# Patient Record
Sex: FEMALE | Race: BLACK OR AFRICAN AMERICAN | NOT HISPANIC OR LATINO | Employment: OTHER | URBAN - METROPOLITAN AREA
[De-identification: names, ages, dates, MRNs, and addresses within clinical notes are randomized per-mention and may not be internally consistent; named-entity substitution may affect disease eponyms.]

---

## 2017-05-31 ENCOUNTER — HOSPITAL ENCOUNTER (EMERGENCY)
Facility: OTHER | Age: 82
End: 2017-05-31
Attending: EMERGENCY MEDICINE
Payer: MEDICARE

## 2017-05-31 VITALS
BODY MASS INDEX: 21.6 KG/M2 | DIASTOLIC BLOOD PRESSURE: 68 MMHG | WEIGHT: 110 LBS | HEIGHT: 60 IN | OXYGEN SATURATION: 97 % | RESPIRATION RATE: 18 BRPM | TEMPERATURE: 99 F | HEART RATE: 80 BPM | SYSTOLIC BLOOD PRESSURE: 162 MMHG
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
## 2017-05-31 PROCEDURE — 99284 EMERGENCY DEPT VISIT MOD MDM: CPT

## 2017-05-31 RX ORDER — LANOLIN ALCOHOL/MO/W.PET/CERES
100 CREAM (GRAM) TOPICAL DAILY
COMMUNITY

## 2017-05-31 RX ORDER — CLOPIDOGREL BISULFATE 75 MG/1
75 TABLET ORAL DAILY
Status: ON HOLD | COMMUNITY
End: 2017-06-17 | Stop reason: HOSPADM

## 2017-05-31 RX ORDER — LEVETIRACETAM 500 MG/1
250 TABLET ORAL 2 TIMES DAILY
COMMUNITY
End: 2017-06-29 | Stop reason: SDUPTHER

## 2017-05-31 RX ORDER — ATORVASTATIN CALCIUM 20 MG/1
20 TABLET, FILM COATED ORAL NIGHTLY
Status: ON HOLD | COMMUNITY
End: 2017-08-20 | Stop reason: HOSPADM

## 2017-05-31 NOTE — ED PROVIDER NOTES
Encounter Date: 5/31/2017    SCRIBE #1 NOTE: I, Radha Calderón, am scribing for, and in the presence of,  Dr. Christie. I have scribed the entire note.       History     Chief Complaint   Patient presents with    Fall     Pt sent to ED from [facility], after falling out of wheelchair. Pt CO right arm pain, and has small laceration to right forehead.     Review of patient's allergies indicates:   Allergen Reactions    Lactose      Time seen by provider: 12:13 PM    This is a 87 y.o. non-ambulatory female with a history of a stroke who presents to ED via EMS from NH with complaint of fall a few hours ago. She states that her left foot got stuck in the wheelchair causing her to tip over, with the wheelchair reportedly on top of her. She spent some time on the floor laying on her left arm and leg causing her pain until EMS arrived and pulled it off her, but denies any residual pain. She claims no head trauma but has forehead lesion that is painful. She also denies LOC, neck pain, back pain, chest pain, fever, or trouble breathing. She states her health is at it's baseline, has had good appetite recently, and otherwise feels good. Patient is a poor historian.       The history is provided by the patient and the EMS personnel.     Past Medical History:   Diagnosis Date    Anemia of decreased vitamin B12 absorption     Aphasia     Coronary atherosclerosis of unspecified type of vessel, native or graft     Epilepsy     Hemiparesis affecting right side as late effect of cerebrovascular accident     Hypertension     Hypokalemia     Other and unspecified hyperlipidemia     Stroke     Transient ischemic attack (TIA), and cerebral infarction without residual deficits     Unspecified hypothyroidism      No past surgical history on file.  No family history on file.  Social History   Substance Use Topics    Smoking status: Former Smoker    Smokeless tobacco: Not on file    Alcohol use Not on file      Review of Systems   Constitutional: Negative for chills and fever.   HENT: Negative for ear pain and sore throat.    Eyes: Negative for pain.   Respiratory: Negative for chest tightness and shortness of breath.    Cardiovascular: Negative for chest pain and leg swelling.   Gastrointestinal: Negative for abdominal pain and nausea.   Genitourinary: Negative for dysuria.   Musculoskeletal: Negative for back pain and neck pain.        Left arm and foot pain    Skin: Positive for wound. Negative for pallor and rash.   Neurological: Negative for syncope and weakness.   Hematological: Does not bruise/bleed easily.   Psychiatric/Behavioral: Negative for behavioral problems.       Physical Exam     Initial Vitals [05/31/17 0927]   BP Pulse Resp Temp SpO2   124/63 84 18 98.6 °F (37 °C) 99 %     Physical Exam    Nursing note and vitals reviewed.  Constitutional: She appears well-developed and well-nourished. She is not diaphoretic. No distress.   HENT:   Head: Normocephalic.   Right Ear: External ear normal.   Left Ear: External ear normal.   Small 2.0cm area of edema and tenderness above right lateral eyebrow. Left temporal scalp with 7.0cm superficial abrasion.    Eyes: Pupils are equal, round, and reactive to light. Right eye exhibits no discharge. Left eye exhibits no discharge.   Neck: Normal range of motion. Neck supple.   Cardiovascular: Normal rate, regular rhythm and normal heart sounds. Exam reveals no gallop and no friction rub.    No murmur heard.  Pulmonary/Chest: Breath sounds normal. No respiratory distress. She has no wheezes. She has no rhonchi. She has no rales.   Abdominal: Soft. Bowel sounds are normal. She exhibits no distension. There is no tenderness. There is no rebound and no guarding.   Musculoskeletal: Normal range of motion. She exhibits no tenderness.   Extremities with no focal tenderness, deformities, ecchymosis. Full ROM.   Neurological: She is alert and oriented to person, place, and time.  She has normal strength. No sensory deficit.   Skin: Skin is warm and dry. No rash noted. No erythema.   Psychiatric: She has a normal mood and affect. Her behavior is normal. Judgment and thought content normal.         ED Course   Procedures  Labs Reviewed - No data to display       X-Rays:   Independently Interpreted Readings:   Head CT: No hemorrhage. No skull fracture. Old CVA in right occipital lobe.     Imaging Results          CT Head Without Contrast (Final result)  Result time 05/31/17 13:10:29    Final result by Harley Aguirre MD (05/31/17 13:10:29)                 Impression:        Multiple remote supratentorial and infratentorial infarcts with age-indeterminate left cerebellar infarct.    Right frontal scalp soft tissue thickening. No fracture or intracranial hemorrhage.    Cerebral volume loss and findings of chronic microvascular ischemic disease.      Electronically signed by: HARLEY AGUIRRE  Date:     05/31/17  Time:    13:10              Narrative:    CLINICAL INDICATION: 87 year old F with fall, head trauma. Right forehead laceration.     TECHNIQUE: CT head without contrast. Axial CT images obtained throughout the head without intravenous contrast. Sagittal and coronal reconstructions were performed.    COMPARISON: No priors.    FINDINGS:    Intracranial compartment:    There is generalized cerebral volume loss, with prominence of ventricles and sulci. No extra-axial blood or fluid collections.    There are large confluent areas of right greater than left encephalomalacia/gliosis in keeping with remote watershed distribution infarcts. Superimposed pronounced degree of confluent periventricular/subcortical white matter hypoattenuation likely relates to sequela of chronic microvascular ischemic disease. Small remote cerebellar infarcts are present bilaterally. Larger left cerebellar infarct is age-indeterminate in appearance. No parenchymal mass, hemorrhage, or edema.      Skull/extracranial contents  (limited evaluation): Right frontal subcutaneous soft tissue thickening. No fracture. Mastoid air cells and paranasal sinuses are essentially clear.                              Medical Decision Making:   Initial Assessment:       87F with h/o CVA with R sided weakness, CAD/HTN presents after mechanical fall. Pt otherwise at baseline recently, reportedly attempted to get out of wheelchair unassisted and it fell over. Initially c/o R arm and leg pain that resolved after she got wheelchair off of her, no sign of injury or focal tenderness on exam, no sign fracture. She denies LOC, but has evidence of minor head trauma, is on Plavix and unreliable historian, so CT head done to r/o intra-cranial injury and no acute process. Normal vitals and no other complaints, no indication for labs or other workup. Stable for discharge back to NH, return precautions given.    Clinical Tests:   Radiological Study: Ordered and Reviewed            Scribe Attestation:   Scribe #1: I performed the above scribed service and the documentation accurately describes the services I performed. I attest to the accuracy of the note.    Attending Attestation:           Physician Attestation for Scribe:  Physician Attestation Statement for Scribe #1: I, Dr. Christie, reviewed documentation, as scribed by Radha Calderón in my presence, and it is both accurate and complete.                 ED Course     Clinical Impression:     1. Fall, initial encounter                Chun Christie MD  05/31/17 1900

## 2017-05-31 NOTE — ED NOTES
EMS reports pt from nursing home fro falling over in wheelchair. Pt denies pain at present time. PT w/ right side weakness/paralysis from previous CVA. Pt is awake and alert, answers questions appropriately. 10cm superficial laceration noted to right side of head, bleeding controlled. Fall risk band applied per protocol

## 2017-06-15 ENCOUNTER — HOSPITAL ENCOUNTER (INPATIENT)
Facility: OTHER | Age: 82
LOS: 2 days | Discharge: HOSPICE | DRG: 669 | End: 2017-06-17
Attending: EMERGENCY MEDICINE | Admitting: EMERGENCY MEDICINE
Payer: MEDICARE

## 2017-06-15 DIAGNOSIS — N17.9 ACUTE RENAL FAILURE, UNSPECIFIED ACUTE RENAL FAILURE TYPE: ICD-10-CM

## 2017-06-15 DIAGNOSIS — I25.10 CORONARY ARTERY DISEASE, ANGINA PRESENCE UNSPECIFIED, UNSPECIFIED VESSEL OR LESION TYPE, UNSPECIFIED WHETHER NATIVE OR TRANSPLANTED HEART: ICD-10-CM

## 2017-06-15 DIAGNOSIS — E87.20 METABOLIC ACIDOSIS, NORMAL ANION GAP (NAG): ICD-10-CM

## 2017-06-15 DIAGNOSIS — G40.909 NONINTRACTABLE EPILEPSY WITHOUT STATUS EPILEPTICUS, UNSPECIFIED EPILEPSY TYPE: ICD-10-CM

## 2017-06-15 DIAGNOSIS — E87.5 HYPERKALEMIA: Primary | ICD-10-CM

## 2017-06-15 DIAGNOSIS — I10 ESSENTIAL HYPERTENSION: ICD-10-CM

## 2017-06-15 DIAGNOSIS — N17.9 ACUTE RENAL FAILURE: ICD-10-CM

## 2017-06-15 DIAGNOSIS — C67.9 MALIGNANT NEOPLASM OF URINARY BLADDER, UNSPECIFIED SITE: ICD-10-CM

## 2017-06-15 DIAGNOSIS — E83.39 HYPERPHOSPHATEMIA: ICD-10-CM

## 2017-06-15 DIAGNOSIS — N13.30 HYDRONEPHROSIS, UNSPECIFIED HYDRONEPHROSIS TYPE: ICD-10-CM

## 2017-06-15 DIAGNOSIS — R31.9 HEMATURIA: ICD-10-CM

## 2017-06-15 PROBLEM — I69.30 HISTORY OF CVA WITH RESIDUAL DEFICIT: Status: ACTIVE | Noted: 2017-06-15

## 2017-06-15 LAB
ANION GAP SERPL CALC-SCNC: 17 MMOL/L
BACTERIA #/AREA URNS HPF: ABNORMAL /HPF
BASOPHILS # BLD AUTO: 0.01 K/UL
BASOPHILS # BLD AUTO: 0.01 K/UL
BASOPHILS NFR BLD: 0.1 %
BASOPHILS NFR BLD: 0.1 %
BILIRUB UR QL STRIP: NEGATIVE
BUN SERPL-MCNC: 118 MG/DL
CALCIUM SERPL-MCNC: 8.7 MG/DL
CHLORIDE SERPL-SCNC: 105 MMOL/L
CLARITY UR: ABNORMAL
CO2 SERPL-SCNC: 19 MMOL/L
COLOR UR: ABNORMAL
CREAT SERPL-MCNC: 14 MG/DL
CREAT UR-MCNC: 23.6 MG/DL
DIFFERENTIAL METHOD: ABNORMAL
DIFFERENTIAL METHOD: ABNORMAL
EOSINOPHIL # BLD AUTO: 0.1 K/UL
EOSINOPHIL # BLD AUTO: 0.1 K/UL
EOSINOPHIL NFR BLD: 0.9 %
EOSINOPHIL NFR BLD: 1.6 %
ERYTHROCYTE [DISTWIDTH] IN BLOOD BY AUTOMATED COUNT: 12.7 %
ERYTHROCYTE [DISTWIDTH] IN BLOOD BY AUTOMATED COUNT: 12.9 %
EST. GFR  (AFRICAN AMERICAN): 2 ML/MIN/1.73 M^2
EST. GFR  (NON AFRICAN AMERICAN): 2 ML/MIN/1.73 M^2
GLUCOSE SERPL-MCNC: 89 MG/DL
GLUCOSE UR QL STRIP: NEGATIVE
HCT VFR BLD AUTO: 27.5 %
HCT VFR BLD AUTO: 29.1 %
HGB BLD-MCNC: 10.4 G/DL
HGB BLD-MCNC: 9.9 G/DL
HGB UR QL STRIP: ABNORMAL
HYALINE CASTS #/AREA URNS LPF: 0 /LPF
KETONES UR QL STRIP: NEGATIVE
LEUKOCYTE ESTERASE UR QL STRIP: ABNORMAL
LYMPHOCYTES # BLD AUTO: 0.9 K/UL
LYMPHOCYTES # BLD AUTO: 1.2 K/UL
LYMPHOCYTES NFR BLD: 10 %
LYMPHOCYTES NFR BLD: 15 %
MCH RBC QN AUTO: 30.8 PG
MCH RBC QN AUTO: 31 PG
MCHC RBC AUTO-ENTMCNC: 35.7 %
MCHC RBC AUTO-ENTMCNC: 36 %
MCV RBC AUTO: 86 FL
MCV RBC AUTO: 86 FL
MICROSCOPIC COMMENT: ABNORMAL
MONOCYTES # BLD AUTO: 0.8 K/UL
MONOCYTES # BLD AUTO: 1 K/UL
MONOCYTES NFR BLD: 10.4 %
MONOCYTES NFR BLD: 11.7 %
NEUTROPHILS # BLD AUTO: 5.6 K/UL
NEUTROPHILS # BLD AUTO: 6.6 K/UL
NEUTROPHILS NFR BLD: 72.4 %
NEUTROPHILS NFR BLD: 76.7 %
NITRITE UR QL STRIP: NEGATIVE
PH UR STRIP: 7 [PH] (ref 5–8)
PLATELET # BLD AUTO: 262 K/UL
PLATELET # BLD AUTO: 263 K/UL
PMV BLD AUTO: 8.6 FL
PMV BLD AUTO: 8.7 FL
POCT GLUCOSE: 110 MG/DL (ref 70–110)
POCT GLUCOSE: 198 MG/DL (ref 70–110)
POCT GLUCOSE: 76 MG/DL (ref 70–110)
POCT GLUCOSE: 81 MG/DL (ref 70–110)
POCT GLUCOSE: 96 MG/DL (ref 70–110)
POTASSIUM SERPL-SCNC: 6.5 MMOL/L
PROT UR QL STRIP: ABNORMAL
PROT UR-MCNC: 100 MG/DL
PROT/CREAT RATIO, UR: 4.24
RBC # BLD AUTO: 3.19 M/UL
RBC # BLD AUTO: 3.38 M/UL
RBC #/AREA URNS HPF: >100 /HPF (ref 0–4)
SODIUM SERPL-SCNC: 141 MMOL/L
SODIUM UR-SCNC: 106 MMOL/L
SP GR UR STRIP: <=1.005 (ref 1–1.03)
SQUAMOUS #/AREA URNS HPF: 2 /HPF
URATE SERPL-MCNC: 7.8 MG/DL
URN SPEC COLLECT METH UR: ABNORMAL
UROBILINOGEN UR STRIP-ACNC: NEGATIVE EU/DL
WBC # BLD AUTO: 7.67 K/UL
WBC # BLD AUTO: 8.58 K/UL
WBC #/AREA URNS HPF: 27 /HPF (ref 0–5)

## 2017-06-15 PROCEDURE — 81000 URINALYSIS NONAUTO W/SCOPE: CPT

## 2017-06-15 PROCEDURE — 84156 ASSAY OF PROTEIN URINE: CPT

## 2017-06-15 PROCEDURE — 36415 COLL VENOUS BLD VENIPUNCTURE: CPT

## 2017-06-15 PROCEDURE — 82803 BLOOD GASES ANY COMBINATION: CPT

## 2017-06-15 PROCEDURE — 85025 COMPLETE CBC W/AUTO DIFF WBC: CPT | Mod: 91

## 2017-06-15 PROCEDURE — 51702 INSERT TEMP BLADDER CATH: CPT

## 2017-06-15 PROCEDURE — 63600175 PHARM REV CODE 636 W HCPCS: Performed by: INTERNAL MEDICINE

## 2017-06-15 PROCEDURE — 82962 GLUCOSE BLOOD TEST: CPT

## 2017-06-15 PROCEDURE — 25000003 PHARM REV CODE 250: Performed by: PHYSICIAN ASSISTANT

## 2017-06-15 PROCEDURE — 25000003 PHARM REV CODE 250: Performed by: EMERGENCY MEDICINE

## 2017-06-15 PROCEDURE — 25000242 PHARM REV CODE 250 ALT 637 W/ HCPCS: Performed by: EMERGENCY MEDICINE

## 2017-06-15 PROCEDURE — 80053 COMPREHEN METABOLIC PANEL: CPT

## 2017-06-15 PROCEDURE — 84550 ASSAY OF BLOOD/URIC ACID: CPT

## 2017-06-15 PROCEDURE — 99223 1ST HOSP IP/OBS HIGH 75: CPT | Mod: AI,,, | Performed by: PHYSICIAN ASSISTANT

## 2017-06-15 PROCEDURE — 80048 BASIC METABOLIC PNL TOTAL CA: CPT | Mod: 91

## 2017-06-15 PROCEDURE — 96361 HYDRATE IV INFUSION ADD-ON: CPT

## 2017-06-15 PROCEDURE — 93010 ELECTROCARDIOGRAM REPORT: CPT | Mod: ,,, | Performed by: INTERNAL MEDICINE

## 2017-06-15 PROCEDURE — 83735 ASSAY OF MAGNESIUM: CPT

## 2017-06-15 PROCEDURE — 87086 URINE CULTURE/COLONY COUNT: CPT

## 2017-06-15 PROCEDURE — 63600175 PHARM REV CODE 636 W HCPCS: Performed by: EMERGENCY MEDICINE

## 2017-06-15 PROCEDURE — 84300 ASSAY OF URINE SODIUM: CPT

## 2017-06-15 PROCEDURE — 96375 TX/PRO/DX INJ NEW DRUG ADDON: CPT

## 2017-06-15 PROCEDURE — 84100 ASSAY OF PHOSPHORUS: CPT

## 2017-06-15 PROCEDURE — 20000000 HC ICU ROOM

## 2017-06-15 PROCEDURE — 99900035 HC TECH TIME PER 15 MIN (STAT)

## 2017-06-15 PROCEDURE — 94640 AIRWAY INHALATION TREATMENT: CPT

## 2017-06-15 PROCEDURE — 96365 THER/PROPH/DIAG IV INF INIT: CPT

## 2017-06-15 PROCEDURE — 99285 EMERGENCY DEPT VISIT HI MDM: CPT | Mod: 25

## 2017-06-15 PROCEDURE — 36600 WITHDRAWAL OF ARTERIAL BLOOD: CPT

## 2017-06-15 PROCEDURE — 80048 BASIC METABOLIC PNL TOTAL CA: CPT

## 2017-06-15 PROCEDURE — 93005 ELECTROCARDIOGRAM TRACING: CPT

## 2017-06-15 RX ORDER — FUROSEMIDE 10 MG/ML
80 INJECTION INTRAMUSCULAR; INTRAVENOUS
Status: COMPLETED | OUTPATIENT
Start: 2017-06-15 | End: 2017-06-15

## 2017-06-15 RX ORDER — SODIUM CHLORIDE 0.9 % (FLUSH) 0.9 %
3 SYRINGE (ML) INJECTION EVERY 8 HOURS
Status: DISCONTINUED | OUTPATIENT
Start: 2017-06-15 | End: 2017-06-17 | Stop reason: HOSPADM

## 2017-06-15 RX ORDER — HEPARIN SODIUM 5000 [USP'U]/ML
5000 INJECTION, SOLUTION INTRAVENOUS; SUBCUTANEOUS EVERY 8 HOURS
Status: DISCONTINUED | OUTPATIENT
Start: 2017-06-15 | End: 2017-06-15

## 2017-06-15 RX ORDER — ONDANSETRON 2 MG/ML
8 INJECTION INTRAMUSCULAR; INTRAVENOUS EVERY 12 HOURS PRN
Status: DISCONTINUED | OUTPATIENT
Start: 2017-06-15 | End: 2017-06-17 | Stop reason: HOSPADM

## 2017-06-15 RX ORDER — ACETAMINOPHEN 325 MG/1
650 TABLET ORAL EVERY 4 HOURS PRN
Status: DISCONTINUED | OUTPATIENT
Start: 2017-06-15 | End: 2017-06-17 | Stop reason: HOSPADM

## 2017-06-15 RX ORDER — GLUCAGON 1 MG
1 KIT INJECTION
Status: DISCONTINUED | OUTPATIENT
Start: 2017-06-15 | End: 2017-06-17

## 2017-06-15 RX ORDER — SODIUM CHLORIDE 9 MG/ML
INJECTION, SOLUTION INTRAVENOUS CONTINUOUS
Status: DISCONTINUED | OUTPATIENT
Start: 2017-06-15 | End: 2017-06-17 | Stop reason: HOSPADM

## 2017-06-15 RX ORDER — ALBUTEROL SULFATE 0.83 MG/ML
10 SOLUTION RESPIRATORY (INHALATION)
Status: COMPLETED | OUTPATIENT
Start: 2017-06-15 | End: 2017-06-15

## 2017-06-15 RX ORDER — SODIUM CHLORIDE 9 MG/ML
1000 INJECTION, SOLUTION INTRAVENOUS
Status: COMPLETED | OUTPATIENT
Start: 2017-06-15 | End: 2017-06-15

## 2017-06-15 RX ORDER — DEXTROSE 50 % IN WATER (D50W) INTRAVENOUS SYRINGE
25
Status: COMPLETED | OUTPATIENT
Start: 2017-06-15 | End: 2017-06-15

## 2017-06-15 RX ORDER — METOLAZONE 5 MG/1
10 TABLET ORAL
Status: DISPENSED | OUTPATIENT
Start: 2017-06-15 | End: 2017-06-16

## 2017-06-15 RX ORDER — IBUPROFEN 200 MG
16 TABLET ORAL
Status: DISCONTINUED | OUTPATIENT
Start: 2017-06-15 | End: 2017-06-17

## 2017-06-15 RX ORDER — IBUPROFEN 200 MG
24 TABLET ORAL
Status: DISCONTINUED | OUTPATIENT
Start: 2017-06-15 | End: 2017-06-17

## 2017-06-15 RX ORDER — FAMOTIDINE 10 MG/ML
20 INJECTION INTRAVENOUS EVERY 12 HOURS
Status: DISCONTINUED | OUTPATIENT
Start: 2017-06-15 | End: 2017-06-17 | Stop reason: HOSPADM

## 2017-06-15 RX ADMIN — FAMOTIDINE 20 MG: 10 INJECTION INTRAVENOUS at 08:06

## 2017-06-15 RX ADMIN — ALBUTEROL SULFATE 10 MG: 2.5 SOLUTION RESPIRATORY (INHALATION) at 08:06

## 2017-06-15 RX ADMIN — SODIUM CHLORIDE 1000 ML: 0.9 INJECTION, SOLUTION INTRAVENOUS at 07:06

## 2017-06-15 RX ADMIN — FUROSEMIDE 80 MG: 10 INJECTION, SOLUTION INTRAMUSCULAR; INTRAVENOUS at 08:06

## 2017-06-15 RX ADMIN — SODIUM CHLORIDE: 0.9 INJECTION, SOLUTION INTRAVENOUS at 10:06

## 2017-06-15 RX ADMIN — CEFTRIAXONE 1 G: 1 INJECTION, SOLUTION INTRAVENOUS at 11:06

## 2017-06-15 RX ADMIN — DEXTROSE MONOHYDRATE 25 G: 25 INJECTION, SOLUTION INTRAVENOUS at 07:06

## 2017-06-15 RX ADMIN — Medication 1 G: at 07:06

## 2017-06-15 RX ADMIN — INSULIN HUMAN 10 UNITS: 100 INJECTION, SOLUTION PARENTERAL at 07:06

## 2017-06-15 NOTE — ED PROVIDER NOTES
Encounter Date: 6/15/2017    SCRIBE #1 NOTE: I, Selvin Salazar, am scribing for, and in the presence of, Dr. Noe.       History     Chief Complaint   Patient presents with    Abnormal Lab     elevated BUN, creatinine and potassium, sent from [facility] by Sivakumar GUDINO     Review of patient's allergies indicates:   Allergen Reactions    Lactose      Time seen by provider: 4:43 PM    This is a 87 y.o. female who presents to the ED with abnormal labs. The patient was sent in by her [facility] home physician via EMS for elevated potassium, creatinine, and BUN. The patient has no pain or complaints at this time.     The patient denies any fever, chills, cough, SOB, CP, abdominal pain, or palpitations.     Hx of a stroke and HTN noted. No known allergies reported.           Past Medical History:   Diagnosis Date    Anemia of decreased vitamin B12 absorption     Aphasia     Coronary atherosclerosis of unspecified type of vessel, native or graft     Epilepsy     Hemiparesis affecting right side as late effect of cerebrovascular accident     Hypertension     Hypokalemia     Hypothyroidism     Other and unspecified hyperlipidemia     Stroke     Transient ischemic attack (TIA), and cerebral infarction without residual deficits     Unspecified hypothyroidism      History reviewed. No pertinent surgical history.  History reviewed. No pertinent family history.  Social History   Substance Use Topics    Smoking status: Former Smoker    Smokeless tobacco: Not on file    Alcohol use No     Review of Systems   Constitutional: Negative for chills and fever.   HENT: Negative for sore throat.    Respiratory: Negative for cough and shortness of breath.    Cardiovascular: Negative for chest pain and palpitations.   Gastrointestinal: Negative for abdominal pain and nausea.   Genitourinary: Negative for dysuria.   Musculoskeletal: Negative for back pain.   Skin: Negative for rash.   Neurological: Negative for weakness.    Hematological: Does not bruise/bleed easily.       Physical Exam     Initial Vitals [06/15/17 1641]   BP Pulse Resp Temp SpO2   138/65 75 18 98.1 °F (36.7 °C) 98 %     Physical Exam    Nursing note and vitals reviewed.  Constitutional: She appears well-developed and well-nourished. She is not diaphoretic. No distress.   HENT:   Head: Normocephalic and atraumatic.   Right Ear: External ear normal.   Left Ear: External ear normal.   Nose: Nose normal.   Mouth/Throat: Oropharynx is clear and moist.   Eyes: Conjunctivae and EOM are normal. Pupils are equal, round, and reactive to light. Right eye exhibits no discharge. Left eye exhibits no discharge.   Neck: Normal range of motion.   Cardiovascular: Normal rate, regular rhythm and normal heart sounds. Exam reveals no gallop and no friction rub.    No murmur heard.  Pulmonary/Chest: Breath sounds normal. No respiratory distress. She has no wheezes. She has no rhonchi. She has no rales.   Abdominal: Soft. There is no tenderness. There is no rebound and no guarding.   Musculoskeletal: She exhibits no edema or tenderness.   Right upper extremity is contracted.   Neurological: She is alert and oriented to person, place, and time.   Skin: Skin is warm and dry. No rash and no abscess noted. No erythema. No pallor.   Psychiatric: She has a normal mood and affect. Her behavior is normal. Judgment and thought content normal.         ED Course   Critical Care  Date/Time: 6/15/2017 9:55 PM  Performed by: ALEXA LEVY  Authorized by: ALEXA LEVY   Direct patient critical care time: 12 minutes  Additional history critical care time: 6 minutes  Ordering / reviewing critical care time: 6 minutes  Documentation critical care time: 4 minutes  Consulting other physicians critical care time: 6 minutes  Total critical care time (exclusive of procedural time) : 34 minutes  Critical care was necessary to treat or prevent imminent or life-threatening deterioration of the following  conditions: renal failure.  Critical care was time spent personally by me on the following activities: blood draw for specimens, development of treatment plan with patient or surrogate, discussions with consultants, interpretation of cardiac output measurements, evaluation of patient's response to treatment, examination of patient, obtaining history from patient or surrogate, ordering and performing treatments and interventions, ordering and review of laboratory studies, pulse oximetry and re-evaluation of patient's condition.        Labs Reviewed   CBC W/ AUTO DIFFERENTIAL - Abnormal; Notable for the following:        Result Value    RBC 3.19 (*)     Hemoglobin 9.9 (*)     Hematocrit 27.5 (*)     MPV 8.7 (*)     Lymph% 15.0 (*)     All other components within normal limits   URINALYSIS - Abnormal; Notable for the following:     Color, UA Red (*)     Appearance, UA Cloudy (*)     Specific Gravity, UA <=1.005 (*)     Protein, UA 2+ (*)     Occult Blood UA 3+ (*)     Leukocytes, UA Trace (*)     All other components within normal limits   BASIC METABOLIC PANEL - Abnormal; Notable for the following:     Potassium 6.5 (*)     CO2 19 (*)     BUN, Bld 118 (*)     Creatinine 14.0 (*)     Anion Gap 17 (*)     eGFR if  2 (*)     eGFR if non  2 (*)     All other components within normal limits    Narrative:     K critical result(s) called and verbal readback obtained from Mark Reid  Cary, 06/15/2017 18:54   URINALYSIS MICROSCOPIC - Abnormal; Notable for the following:     RBC, UA >100 (*)     WBC, UA 27 (*)     All other components within normal limits   POCT GLUCOSE - Abnormal; Notable for the following:     POCT Glucose 198 (*)     All other components within normal limits   CULTURE, URINE   CULTURE, URINE   COMPREHENSIVE METABOLIC PANEL   MAGNESIUM   PHOSPHORUS   POCT GLUCOSE   POCT GLUCOSE   POCT GLUCOSE   POCT GLUCOSE MONITORING CONTINUOUS   POCT GLUCOSE MONITORING CONTINUOUS    POCT GLUCOSE MONITORING CONTINUOUS     EKG Readings: (Independently Interpreted)   Normal sinus rhythm. Rate of 74. Mild sinus arrhythmia present. No STEMI. No peaked T waves.           Medical Decision Making:   ED Management:  6:55 PM: Received call from lab with results of a critical potassium at 6.5.     7:01 PM: Discussed the patient's case with Piper LEMUS who will admit to the hospitalist's service.     7:14 PM: Discussed the patient's case with Dr. Rosario he will follow the patient. He will repeat BMP after giving metolazone, fluids, and other treatments.     7:16 PM: Discussed the case again with hospitalist PA. Orders are in progress. She has discussed the case with critical care.  She is advised that Dr. Rosario should be called with results of repeat BMP.    Emergent evaluation of 87-year-old female with complaint of elevated potassium.  She is asymptomatic and denies any complaints.  Vital signs are benign, afebrile.  Physical exam is benign.  There were no significant EKG changes, but potassium is indeed elevated at 6.5.  There is significant elevation of BUN/creatinine creatinine.  She was treated with calcium gluconate, albuterol, insulin and D50.  I discussed the case with nephrology recommended metolazone, fluids, repeat labs.  Discussed the case with the hospitalist to discuss the case with critical care.  She is admitted in s serious condition to the ICU for further care, likely dialysis urgently though not emergently.            Scribe Attestation:   Scribe #1: I performed the above scribed service and the documentation accurately describes the services I performed. I attest to the accuracy of the note.    Attending Attestation:           Physician Attestation for Scribe:  Physician Attestation Statement for Scribe #1: I, Dr. Noe, reviewed documentation, as scribed by Selvin Salazar in my presence, and it is both accurate and complete.                 ED Course     Clinical Impression:      1. Hyperkalemia    2. Acute renal failure                Margie Noe MD  06/15/17 2660

## 2017-06-16 ENCOUNTER — ANESTHESIA (OUTPATIENT)
Dept: SURGERY | Facility: OTHER | Age: 82
DRG: 669 | End: 2017-06-16
Payer: MEDICARE

## 2017-06-16 ENCOUNTER — ANESTHESIA EVENT (OUTPATIENT)
Dept: SURGERY | Facility: OTHER | Age: 82
DRG: 669 | End: 2017-06-16
Payer: MEDICARE

## 2017-06-16 ENCOUNTER — SURGERY (OUTPATIENT)
Age: 82
End: 2017-06-16

## 2017-06-16 PROBLEM — E83.39 HYPERPHOSPHATEMIA: Status: ACTIVE | Noted: 2017-06-16

## 2017-06-16 PROBLEM — E87.20 METABOLIC ACIDOSIS, NORMAL ANION GAP (NAG): Status: ACTIVE | Noted: 2017-06-16

## 2017-06-16 PROBLEM — Z85.51 HISTORY OF BLADDER CANCER: Status: ACTIVE | Noted: 2017-06-16

## 2017-06-16 PROBLEM — Z79.01 CURRENT USE OF ANTICOAGULANT THERAPY: Status: ACTIVE | Noted: 2017-06-16

## 2017-06-16 PROBLEM — R31.9 HEMATURIA: Status: ACTIVE | Noted: 2017-06-16

## 2017-06-16 PROBLEM — N13.30 HYDRONEPHROSIS: Status: ACTIVE | Noted: 2017-06-16

## 2017-06-16 PROBLEM — C67.8 MALIGNANT NEOPLASM OF OVERLAPPING SITES OF BLADDER: Status: ACTIVE | Noted: 2017-06-16

## 2017-06-16 PROBLEM — G40.909 NONINTRACTABLE EPILEPSY WITHOUT STATUS EPILEPTICUS: Status: ACTIVE | Noted: 2017-06-16

## 2017-06-16 PROBLEM — D64.9 ANEMIA: Status: ACTIVE | Noted: 2017-06-16

## 2017-06-16 LAB
ABO + RH BLD: NORMAL
ALBUMIN SERPL BCP-MCNC: 3.2 G/DL
ALP SERPL-CCNC: 57 U/L
ALT SERPL W/O P-5'-P-CCNC: 20 U/L
ANION GAP SERPL CALC-SCNC: 16 MMOL/L
ANION GAP SERPL CALC-SCNC: 18 MMOL/L
AST SERPL-CCNC: 13 U/L
BASOPHILS # BLD AUTO: 0.01 K/UL
BASOPHILS # BLD AUTO: 0.01 K/UL
BASOPHILS NFR BLD: 0.2 %
BASOPHILS NFR BLD: 0.2 %
BILIRUB DIRECT SERPL-MCNC: 0.2 MG/DL
BILIRUB SERPL-MCNC: 0.6 MG/DL
BLD GP AB SCN CELLS X3 SERPL QL: NORMAL
BUN SERPL-MCNC: 102 MG/DL
BUN SERPL-MCNC: 92 MG/DL
C3 SERPL-MCNC: 131 MG/DL
C4 SERPL-MCNC: 39 MG/DL
CALCIUM SERPL-MCNC: 9 MG/DL
CALCIUM SERPL-MCNC: 9.1 MG/DL
CHLORIDE SERPL-SCNC: 109 MMOL/L
CHLORIDE SERPL-SCNC: 111 MMOL/L
CHOLEST/HDLC SERPL: 2.7 {RATIO}
CO2 SERPL-SCNC: 15 MMOL/L
CO2 SERPL-SCNC: 17 MMOL/L
CREAT SERPL-MCNC: 10.3 MG/DL
CREAT SERPL-MCNC: 11.8 MG/DL
DIFFERENTIAL METHOD: ABNORMAL
DIFFERENTIAL METHOD: ABNORMAL
EOSINOPHIL # BLD AUTO: 0.1 K/UL
EOSINOPHIL # BLD AUTO: 0.1 K/UL
EOSINOPHIL NFR BLD: 1.5 %
EOSINOPHIL NFR BLD: 1.6 %
ERYTHROCYTE [DISTWIDTH] IN BLOOD BY AUTOMATED COUNT: 12.9 %
ERYTHROCYTE [DISTWIDTH] IN BLOOD BY AUTOMATED COUNT: 13 %
EST. GFR  (AFRICAN AMERICAN): 3 ML/MIN/1.73 M^2
EST. GFR  (AFRICAN AMERICAN): 3 ML/MIN/1.73 M^2
EST. GFR  (NON AFRICAN AMERICAN): 3 ML/MIN/1.73 M^2
EST. GFR  (NON AFRICAN AMERICAN): 3 ML/MIN/1.73 M^2
FERRITIN SERPL-MCNC: 323 NG/ML
FOLATE SERPL-MCNC: 13.4 NG/ML
GLUCOSE SERPL-MCNC: 104 MG/DL
GLUCOSE SERPL-MCNC: 91 MG/DL
HAV IGM SERPL QL IA: NEGATIVE
HBV CORE IGM SERPL QL IA: NEGATIVE
HBV SURFACE AG SERPL QL IA: NEGATIVE
HCT VFR BLD AUTO: 25.7 %
HCT VFR BLD AUTO: 29 %
HCV AB SERPL QL IA: NEGATIVE
HDL/CHOLESTEROL RATIO: 37.4 %
HDLC SERPL-MCNC: 155 MG/DL
HDLC SERPL-MCNC: 58 MG/DL
HGB BLD-MCNC: 10.4 G/DL
HGB BLD-MCNC: 9 G/DL
IRON SERPL-MCNC: 76 UG/DL
LDLC SERPL CALC-MCNC: 85.4 MG/DL
LYMPHOCYTES # BLD AUTO: 0.9 K/UL
LYMPHOCYTES # BLD AUTO: 1 K/UL
LYMPHOCYTES NFR BLD: 13.9 %
LYMPHOCYTES NFR BLD: 15.2 %
MAGNESIUM SERPL-MCNC: 2.1 MG/DL
MCH RBC QN AUTO: 30.6 PG
MCH RBC QN AUTO: 31 PG
MCHC RBC AUTO-ENTMCNC: 35 %
MCHC RBC AUTO-ENTMCNC: 35.9 %
MCV RBC AUTO: 87 FL
MCV RBC AUTO: 87 FL
MONOCYTES # BLD AUTO: 0.6 K/UL
MONOCYTES # BLD AUTO: 0.7 K/UL
MONOCYTES NFR BLD: 10.5 %
MONOCYTES NFR BLD: 9.7 %
NEUTROPHILS # BLD AUTO: 4.5 K/UL
NEUTROPHILS # BLD AUTO: 4.7 K/UL
NEUTROPHILS NFR BLD: 72.7 %
NEUTROPHILS NFR BLD: 72.9 %
NONHDLC SERPL-MCNC: 97 MG/DL
PHOSPHATE SERPL-MCNC: 5.2 MG/DL
PLATELET # BLD AUTO: 262 K/UL
PLATELET # BLD AUTO: 287 K/UL
PMV BLD AUTO: 8.6 FL
PMV BLD AUTO: 8.6 FL
POTASSIUM SERPL-SCNC: 5.1 MMOL/L
POTASSIUM SERPL-SCNC: 5.4 MMOL/L
PROT SERPL-MCNC: 7.8 G/DL
PTH-INTACT SERPL-MCNC: 267.7 PG/ML
RBC # BLD AUTO: 2.94 M/UL
RBC # BLD AUTO: 3.35 M/UL
RPR SER QL: NORMAL
SATURATED IRON: 29 %
SODIUM SERPL-SCNC: 142 MMOL/L
SODIUM SERPL-SCNC: 144 MMOL/L
T4 FREE SERPL-MCNC: 1.04 NG/DL
TOTAL IRON BINDING CAPACITY: 265 UG/DL
TRANSFERRIN SERPL-MCNC: 179 MG/DL
TRIGL SERPL-MCNC: 58 MG/DL
TSH SERPL DL<=0.005 MIU/L-ACNC: 0.1 UIU/ML
VIT B12 SERPL-MCNC: >2000 PG/ML
WBC # BLD AUTO: 6.19 K/UL
WBC # BLD AUTO: 6.39 K/UL

## 2017-06-16 PROCEDURE — 25000003 PHARM REV CODE 250: Performed by: NURSE ANESTHETIST, CERTIFIED REGISTERED

## 2017-06-16 PROCEDURE — 25500020 PHARM REV CODE 255: Performed by: UROLOGY

## 2017-06-16 PROCEDURE — 86850 RBC ANTIBODY SCREEN: CPT

## 2017-06-16 PROCEDURE — 86255 FLUORESCENT ANTIBODY SCREEN: CPT | Mod: 91

## 2017-06-16 PROCEDURE — 37000008 HC ANESTHESIA 1ST 15 MINUTES: Performed by: UROLOGY

## 2017-06-16 PROCEDURE — P9045 ALBUMIN (HUMAN), 5%, 250 ML: HCPCS | Performed by: NURSE ANESTHETIST, CERTIFIED REGISTERED

## 2017-06-16 PROCEDURE — 86334 IMMUNOFIX E-PHORESIS SERUM: CPT

## 2017-06-16 PROCEDURE — 82746 ASSAY OF FOLIC ACID SERUM: CPT

## 2017-06-16 PROCEDURE — 86038 ANTINUCLEAR ANTIBODIES: CPT

## 2017-06-16 PROCEDURE — 86160 COMPLEMENT ANTIGEN: CPT | Mod: 59

## 2017-06-16 PROCEDURE — 86255 FLUORESCENT ANTIBODY SCREEN: CPT

## 2017-06-16 PROCEDURE — 86900 BLOOD TYPING SEROLOGIC ABO: CPT

## 2017-06-16 PROCEDURE — 80074 ACUTE HEPATITIS PANEL: CPT

## 2017-06-16 PROCEDURE — 80048 BASIC METABOLIC PNL TOTAL CA: CPT

## 2017-06-16 PROCEDURE — 82607 VITAMIN B-12: CPT

## 2017-06-16 PROCEDURE — 20000000 HC ICU ROOM

## 2017-06-16 PROCEDURE — 83540 ASSAY OF IRON: CPT

## 2017-06-16 PROCEDURE — 86160 COMPLEMENT ANTIGEN: CPT

## 2017-06-16 PROCEDURE — 83520 IMMUNOASSAY QUANT NOS NONAB: CPT

## 2017-06-16 PROCEDURE — 63600175 PHARM REV CODE 636 W HCPCS: Performed by: INTERNAL MEDICINE

## 2017-06-16 PROCEDURE — 84439 ASSAY OF FREE THYROXINE: CPT

## 2017-06-16 PROCEDURE — 52214 CYSTOSCOPY AND TREATMENT: CPT | Mod: ,,, | Performed by: UROLOGY

## 2017-06-16 PROCEDURE — 82728 ASSAY OF FERRITIN: CPT

## 2017-06-16 PROCEDURE — 86039 ANTINUCLEAR ANTIBODIES (ANA): CPT

## 2017-06-16 PROCEDURE — 37000009 HC ANESTHESIA EA ADD 15 MINS: Performed by: UROLOGY

## 2017-06-16 PROCEDURE — 25000003 PHARM REV CODE 250: Performed by: PHYSICIAN ASSISTANT

## 2017-06-16 PROCEDURE — 36000706: Performed by: UROLOGY

## 2017-06-16 PROCEDURE — 99223 1ST HOSP IP/OBS HIGH 75: CPT | Mod: 25,,, | Performed by: UROLOGY

## 2017-06-16 PROCEDURE — 36000707: Performed by: UROLOGY

## 2017-06-16 PROCEDURE — 86920 COMPATIBILITY TEST SPIN: CPT

## 2017-06-16 PROCEDURE — 84100 ASSAY OF PHOSPHORUS: CPT

## 2017-06-16 PROCEDURE — 52001 CYSTO W/IRRG&EVAC MLT CLOTS: CPT | Mod: 59,,, | Performed by: UROLOGY

## 2017-06-16 PROCEDURE — 86235 NUCLEAR ANTIGEN ANTIBODY: CPT | Mod: 59

## 2017-06-16 PROCEDURE — 0TCB8ZZ EXTIRPATION OF MATTER FROM BLADDER, VIA NATURAL OR ARTIFICIAL OPENING ENDOSCOPIC: ICD-10-PCS | Performed by: UROLOGY

## 2017-06-16 PROCEDURE — C1769 GUIDE WIRE: HCPCS | Performed by: UROLOGY

## 2017-06-16 PROCEDURE — 83970 ASSAY OF PARATHORMONE: CPT

## 2017-06-16 PROCEDURE — 86592 SYPHILIS TEST NON-TREP QUAL: CPT

## 2017-06-16 PROCEDURE — 84443 ASSAY THYROID STIM HORMONE: CPT

## 2017-06-16 PROCEDURE — 0T5B8ZZ DESTRUCTION OF BLADDER, VIA NATURAL OR ARTIFICIAL OPENING ENDOSCOPIC: ICD-10-PCS | Performed by: UROLOGY

## 2017-06-16 PROCEDURE — 99233 SBSQ HOSP IP/OBS HIGH 50: CPT | Mod: ,,, | Performed by: HOSPITALIST

## 2017-06-16 PROCEDURE — 36415 COLL VENOUS BLD VENIPUNCTURE: CPT

## 2017-06-16 PROCEDURE — 80076 HEPATIC FUNCTION PANEL: CPT

## 2017-06-16 PROCEDURE — 63600175 PHARM REV CODE 636 W HCPCS: Performed by: NURSE ANESTHETIST, CERTIFIED REGISTERED

## 2017-06-16 PROCEDURE — 80061 LIPID PANEL: CPT

## 2017-06-16 PROCEDURE — 83735 ASSAY OF MAGNESIUM: CPT

## 2017-06-16 PROCEDURE — 85025 COMPLETE CBC W/AUTO DIFF WBC: CPT

## 2017-06-16 PROCEDURE — 86334 IMMUNOFIX E-PHORESIS SERUM: CPT | Mod: 26,,, | Performed by: PATHOLOGY

## 2017-06-16 RX ORDER — HYDROMORPHONE HYDROCHLORIDE 2 MG/ML
0.4 INJECTION, SOLUTION INTRAMUSCULAR; INTRAVENOUS; SUBCUTANEOUS EVERY 5 MIN PRN
Status: CANCELLED | OUTPATIENT
Start: 2017-06-16

## 2017-06-16 RX ORDER — PROPOFOL 10 MG/ML
VIAL (ML) INTRAVENOUS
Status: DISCONTINUED | OUTPATIENT
Start: 2017-06-16 | End: 2017-06-16

## 2017-06-16 RX ORDER — HYDROCODONE BITARTRATE AND ACETAMINOPHEN 500; 5 MG/1; MG/1
TABLET ORAL
Status: DISCONTINUED | OUTPATIENT
Start: 2017-06-16 | End: 2017-06-17 | Stop reason: HOSPADM

## 2017-06-16 RX ORDER — ALBUMIN HUMAN 50 G/1000ML
SOLUTION INTRAVENOUS CONTINUOUS PRN
Status: DISCONTINUED | OUTPATIENT
Start: 2017-06-16 | End: 2017-06-16

## 2017-06-16 RX ORDER — ONDANSETRON 2 MG/ML
INJECTION INTRAMUSCULAR; INTRAVENOUS
Status: DISCONTINUED | OUTPATIENT
Start: 2017-06-16 | End: 2017-06-16

## 2017-06-16 RX ORDER — CISATRACURIUM BESYLATE 2 MG/ML
INJECTION, SOLUTION INTRAVENOUS
Status: DISCONTINUED | OUTPATIENT
Start: 2017-06-16 | End: 2017-06-16

## 2017-06-16 RX ORDER — FENTANYL CITRATE 50 UG/ML
25 INJECTION, SOLUTION INTRAMUSCULAR; INTRAVENOUS EVERY 5 MIN PRN
Status: CANCELLED | OUTPATIENT
Start: 2017-06-16

## 2017-06-16 RX ORDER — ESMOLOL HYDROCHLORIDE 10 MG/ML
INJECTION INTRAVENOUS
Status: DISCONTINUED | OUTPATIENT
Start: 2017-06-16 | End: 2017-06-16

## 2017-06-16 RX ORDER — HYDRALAZINE HYDROCHLORIDE 20 MG/ML
10 INJECTION INTRAMUSCULAR; INTRAVENOUS EVERY 8 HOURS PRN
Status: DISCONTINUED | OUTPATIENT
Start: 2017-06-16 | End: 2017-06-17 | Stop reason: HOSPADM

## 2017-06-16 RX ORDER — SODIUM CHLORIDE 9 MG/ML
1000 INJECTION, SOLUTION INTRAVENOUS
Status: ACTIVE | OUTPATIENT
Start: 2017-06-16 | End: 2017-06-16

## 2017-06-16 RX ORDER — PHENYLEPHRINE HYDROCHLORIDE 10 MG/ML
INJECTION INTRAVENOUS
Status: DISCONTINUED | OUTPATIENT
Start: 2017-06-16 | End: 2017-06-16

## 2017-06-16 RX ORDER — SODIUM CHLORIDE 0.9 % (FLUSH) 0.9 %
3 SYRINGE (ML) INJECTION EVERY 8 HOURS
Status: CANCELLED | OUTPATIENT
Start: 2017-06-16

## 2017-06-16 RX ORDER — ONDANSETRON 2 MG/ML
4 INJECTION INTRAMUSCULAR; INTRAVENOUS ONCE AS NEEDED
Status: CANCELLED | OUTPATIENT
Start: 2017-06-16 | End: 2017-06-16

## 2017-06-16 RX ORDER — CIPROFLOXACIN 2 MG/ML
INJECTION, SOLUTION INTRAVENOUS
Status: DISCONTINUED | OUTPATIENT
Start: 2017-06-16 | End: 2017-06-16

## 2017-06-16 RX ORDER — LIDOCAINE HCL/PF 100 MG/5ML
SYRINGE (ML) INTRAVENOUS
Status: DISCONTINUED | OUTPATIENT
Start: 2017-06-16 | End: 2017-06-16

## 2017-06-16 RX ORDER — FENTANYL CITRATE 50 UG/ML
INJECTION, SOLUTION INTRAMUSCULAR; INTRAVENOUS
Status: DISCONTINUED | OUTPATIENT
Start: 2017-06-16 | End: 2017-06-16

## 2017-06-16 RX ORDER — OXYCODONE HYDROCHLORIDE 5 MG/1
5 TABLET ORAL
Status: CANCELLED | OUTPATIENT
Start: 2017-06-16

## 2017-06-16 RX ORDER — LEVETIRACETAM 250 MG/1
250 TABLET ORAL 2 TIMES DAILY
Status: DISCONTINUED | OUTPATIENT
Start: 2017-06-16 | End: 2017-06-17 | Stop reason: HOSPADM

## 2017-06-16 RX ORDER — GLYCOPYRROLATE 0.2 MG/ML
INJECTION INTRAMUSCULAR; INTRAVENOUS
Status: DISCONTINUED | OUTPATIENT
Start: 2017-06-16 | End: 2017-06-16

## 2017-06-16 RX ORDER — METOLAZONE 5 MG/1
5 TABLET ORAL ONCE
Status: DISCONTINUED | OUTPATIENT
Start: 2017-06-16 | End: 2017-06-17 | Stop reason: HOSPADM

## 2017-06-16 RX ORDER — ATORVASTATIN CALCIUM 20 MG/1
20 TABLET, FILM COATED ORAL NIGHTLY
Status: DISCONTINUED | OUTPATIENT
Start: 2017-06-16 | End: 2017-06-17 | Stop reason: HOSPADM

## 2017-06-16 RX ORDER — SODIUM CHLORIDE 0.9 % (FLUSH) 0.9 %
3 SYRINGE (ML) INJECTION
Status: DISCONTINUED | OUTPATIENT
Start: 2017-06-16 | End: 2017-06-17 | Stop reason: HOSPADM

## 2017-06-16 RX ORDER — CLOPIDOGREL BISULFATE 75 MG/1
75 TABLET ORAL DAILY
Status: DISCONTINUED | OUTPATIENT
Start: 2017-06-16 | End: 2017-06-16

## 2017-06-16 RX ORDER — MEPERIDINE HYDROCHLORIDE 50 MG/ML
12.5 INJECTION INTRAMUSCULAR; INTRAVENOUS; SUBCUTANEOUS ONCE AS NEEDED
Status: CANCELLED | OUTPATIENT
Start: 2017-06-16 | End: 2017-06-16

## 2017-06-16 RX ADMIN — ATORVASTATIN CALCIUM 20 MG: 20 TABLET, FILM COATED ORAL at 09:06

## 2017-06-16 RX ADMIN — CALCIUM CHLORIDE 0.5 G: 100 INJECTION, SOLUTION INTRAVENOUS at 12:06

## 2017-06-16 RX ADMIN — PHENYLEPHRINE HYDROCHLORIDE 100 MCG: 10 INJECTION INTRAVENOUS at 01:06

## 2017-06-16 RX ADMIN — GLYCOPYRROLATE 0.2 MG: 0.2 INJECTION, SOLUTION INTRAMUSCULAR; INTRAVENOUS at 01:06

## 2017-06-16 RX ADMIN — LEVETIRACETAM 250 MG: 250 TABLET, FILM COATED ORAL at 09:06

## 2017-06-16 RX ADMIN — PHENYLEPHRINE HYDROCHLORIDE 300 MCG: 10 INJECTION INTRAVENOUS at 12:06

## 2017-06-16 RX ADMIN — SODIUM CHLORIDE, PRESERVATIVE FREE 3 ML: 5 INJECTION INTRAVENOUS at 02:06

## 2017-06-16 RX ADMIN — FAMOTIDINE 20 MG: 10 INJECTION INTRAVENOUS at 09:06

## 2017-06-16 RX ADMIN — PROPOFOL 100 MG: 10 INJECTION, EMULSION INTRAVENOUS at 12:06

## 2017-06-16 RX ADMIN — LIDOCAINE HYDROCHLORIDE 100 MG: 20 INJECTION, SOLUTION INTRAVENOUS at 12:06

## 2017-06-16 RX ADMIN — CISATRACURIUM BESYLATE 2 MG: 2 INJECTION INTRAVENOUS at 12:06

## 2017-06-16 RX ADMIN — CISATRACURIUM BESYLATE 6 MG: 2 INJECTION INTRAVENOUS at 12:06

## 2017-06-16 RX ADMIN — ESMOLOL HYDROCHLORIDE 20 MG: 10 INJECTION, SOLUTION INTRAVENOUS at 12:06

## 2017-06-16 RX ADMIN — PHENYLEPHRINE HYDROCHLORIDE 150 MCG: 10 INJECTION INTRAVENOUS at 12:06

## 2017-06-16 RX ADMIN — PHENYLEPHRINE HYDROCHLORIDE 200 MCG: 10 INJECTION INTRAVENOUS at 12:06

## 2017-06-16 RX ADMIN — FENTANYL CITRATE 50 MCG: 50 INJECTION, SOLUTION INTRAMUSCULAR; INTRAVENOUS at 12:06

## 2017-06-16 RX ADMIN — SODIUM CHLORIDE, PRESERVATIVE FREE 3 ML: 5 INJECTION INTRAVENOUS at 09:06

## 2017-06-16 RX ADMIN — IOHEXOL 50 ML: 300 INJECTION, SOLUTION INTRAVENOUS at 02:06

## 2017-06-16 RX ADMIN — FENTANYL CITRATE 50 MCG: 50 INJECTION, SOLUTION INTRAMUSCULAR; INTRAVENOUS at 01:06

## 2017-06-16 RX ADMIN — CEFTRIAXONE 1 G: 1 INJECTION, SOLUTION INTRAVENOUS at 10:06

## 2017-06-16 RX ADMIN — CIPROFLOXACIN 400 MG: 2 INJECTION, SOLUTION INTRAVENOUS at 12:06

## 2017-06-16 RX ADMIN — ALBUMIN (HUMAN): 2.5 SOLUTION INTRAVENOUS at 12:06

## 2017-06-16 RX ADMIN — CALCIUM CHLORIDE 0.25 G: 100 INJECTION, SOLUTION INTRAVENOUS at 12:06

## 2017-06-16 RX ADMIN — PHENYLEPHRINE HYDROCHLORIDE 200 MCG: 10 INJECTION INTRAVENOUS at 01:06

## 2017-06-16 RX ADMIN — ONDANSETRON 4 MG: 2 INJECTION INTRAMUSCULAR; INTRAVENOUS at 12:06

## 2017-06-16 RX ADMIN — PHENYLEPHRINE HYDROCHLORIDE 150 MCG: 10 INJECTION INTRAVENOUS at 01:06

## 2017-06-16 NOTE — TRANSFER OF CARE
"Anesthesia Transfer of Care Note    Patient: Yamini Ellis    Procedure(s) Performed: Procedure(s) (LRB):  CYSTOSCOPY (N/A)  FULGURATION-BLADDER TUMOR (N/A)  EVACUATION-BLOOD CLOT (N/A)    Patient location: ICU    Anesthesia Type: general    Transport from OR: Transported from OR on 2-3 L/min O2 by NC with adequate spontaneous ventilation    Post pain: adequate analgesia    Post assessment: no apparent anesthetic complications    Post vital signs: stable    Level of consciousness: awake, alert and oriented    Nausea/Vomiting: no nausea/vomiting    Complications: none    Transfer of care protocol was followed      Last vitals:   Visit Vitals  BP (!) 114/58   Pulse 80   Temp 36.4 °C (97.5 °F) (Oral)   Resp 18   Ht 5' 5" (1.651 m)   Wt 59.1 kg (130 lb 4.7 oz)   SpO2 100%   Breastfeeding? No   BMI 21.68 kg/m²     "

## 2017-06-16 NOTE — SUBJECTIVE & OBJECTIVE
Past Medical History:   Diagnosis Date    Anemia of decreased vitamin B12 absorption     Aphasia     Coronary atherosclerosis of unspecified type of vessel, native or graft     Epilepsy     Hemiparesis affecting right side as late effect of cerebrovascular accident     Hypertension     Hypokalemia     Hypothyroidism     Other and unspecified hyperlipidemia     Stroke     Transient ischemic attack (TIA), and cerebral infarction without residual deficits     Unspecified hypothyroidism        History reviewed. No pertinent surgical history.    Review of patient's allergies indicates:   Allergen Reactions    Lactose        No current facility-administered medications on file prior to encounter.      Current Outpatient Prescriptions on File Prior to Encounter   Medication Sig    atorvastatin (LIPITOR) 20 MG tablet Take 20 mg by mouth every evening.    clopidogrel (PLAVIX) 75 mg tablet Take 75 mg by mouth once daily.    cyanocobalamin (VITAMIN B-12) 1000 MCG tablet Take 100 mcg by mouth once daily.    levetiracetam (KEPPRA) 500 MG Tab Take 250 mg by mouth 2 (two) times daily.     mirabegron (MYRBETRIQ) 25 mg Tb24 ER tablet Take 25 mg by mouth once daily.    potassium bicarb-citric acid 10 mEq TbEF Take 0.5 tablets by mouth once daily.     Family History     None        Social History Main Topics    Smoking status: Former Smoker    Smokeless tobacco: Not on file    Alcohol use No    Drug use: No    Sexual activity: Not on file     Review of Systems   Constitutional: Positive for appetite change (decreased). Negative for activity change, chills, diaphoresis, fatigue and fever.   Respiratory: Negative for cough, shortness of breath and wheezing.    Cardiovascular: Negative for chest pain, palpitations and leg swelling.   Gastrointestinal: Negative for abdominal pain, constipation, diarrhea, nausea and vomiting.   Genitourinary: Negative for dysuria, flank pain, frequency, hematuria and urgency.    Musculoskeletal: Negative for back pain, gait problem, joint swelling, myalgias, neck pain and neck stiffness.   Skin: Negative for color change, pallor, rash and wound.   Neurological: Positive for weakness. Negative for tremors, speech difficulty and headaches.   Psychiatric/Behavioral: Negative for confusion.     Objective:     Vital Signs (Most Recent):  Temp: 98.1 °F (36.7 °C) (06/15/17 1641)  Pulse: 96 (06/15/17 2047)  Resp: 15 (06/15/17 2018)  BP: (!) 151/67 (06/15/17 2023)  SpO2: 100 % (06/15/17 2047) Vital Signs (24h Range):  Temp:  [98.1 °F (36.7 °C)] 98.1 °F (36.7 °C)  Pulse:  [72-96] 96  Resp:  [15-18] 15  SpO2:  [99 %-100 %] 100 %  BP: (146-153)/(67-69) 151/67     Weight: 59.1 kg (130 lb 4.7 oz)  Body mass index is 21.68 kg/m².    Physical Exam   Constitutional: She is oriented to person, place, and time. She appears well-developed and well-nourished. No distress.   Thin, elderly female recumbent in semi-fetal position.    HENT:   Head: Normocephalic and atraumatic.   Oral mucous membranes mildly dry.   Eyes: Conjunctivae and EOM are normal. Pupils are equal, round, and reactive to light. No scleral icterus.   Neck: Normal range of motion. Neck supple. No JVD present. No tracheal deviation present.   Cardiovascular: Normal rate, regular rhythm, normal heart sounds and intact distal pulses.  Exam reveals no gallop and no friction rub.    No murmur heard.  2+ distal radial/DT/PT pulses. No carotid bruits.     Pulmonary/Chest: Effort normal and breath sounds normal. No stridor. No respiratory distress. She has no wheezes. She has no rales.   Abdominal: Soft. Bowel sounds are normal. She exhibits no distension and no mass. There is no tenderness. There is no guarding.   Musculoskeletal: Normal range of motion. She exhibits no deformity.   Neurological: She is alert and oriented to person, place, and time. No cranial nerve deficit. She exhibits normal muscle tone.   Alert and oriented to self, location.  Often avoids answering questions by switching topics.    Skin: Skin is warm and dry. No rash noted. She is not diaphoretic. No erythema. No pallor.   Extremities appear dry.   Psychiatric: She has a normal mood and affect. Her behavior is normal. Judgment and thought content normal.   Nursing note and vitals reviewed.       Significant Labs:     CBC:   Recent Labs  Lab 06/15/17  1656   WBC 7.67   HGB 9.9*   HCT 27.5*        CMP:   Recent Labs  Lab 06/15/17  1802      K 6.5*      CO2 19*   GLU 89   *   CREATININE 14.0*   CALCIUM 8.7   ANIONGAP 17*   EGFRNONAA 2*     All pertinent labs within the past 24 hours have been reviewed.    Significant Imaging: I have reviewed all pertinent imaging results/findings within the past 24 hours.   - No imaging tests available at time of exam.

## 2017-06-16 NOTE — ASSESSMENT & PLAN NOTE
- no EKG changes   - Hyperkalemia cocktail administered   - monitor with q2h BMPs   - likely 2/2 potassium use combined with ARF

## 2017-06-16 NOTE — OP NOTE
DATE OF PROCEDURE:  06/16/2017    SURGEON:  Ger Yo M.D.    PROCEDURES:  1.  Cystoscopy with examination under anesthesia.  2.  Clot evacuation.  3.  Fulguration of bladder tumor, large.  4.  Attempted ureteral stent placement.    HISTORY:  Ms. Ellis is an 87-year-old nursing home patient.  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  She   is on Plavix.  She came to the Emergency Room today from her nursing home with   clot urinary retention and hematuria and acute renal failure.  I was consulted   by the Intensive Care Unit team.  She has bilateral hydroureteronephrosis.  Her   creatinine is 10.  She has significant hematuria with active bleeding.  The   patient is on Plavix.  I spoke with the neighbor who has power of  and   obtained consent.  The neighbor is able to give me some medical history.  No   medical records are available.  She tells me that she has a history of bladder   cancer and was treated previously at [facility] by Dr. Veto Rice.  I tried to   reach Dr. Rice and was not able to reach him before the surgery.    PROCEDURE IN DETAIL:  The patient was brought to the Cysto Suite.  Preoperative   IV antibiotics were administered.  TEDs and SCDs were applied.  She underwent   general endotracheal anesthesia.  She did become hypotensive with induction of   anesthesia.  The Anesthesia team managed this and the hypotension resolved.  She   did have evidence of active bleeding prior to the case with a Powers bag full of bright red blood.    The Powers was removed and the genital area was prepped and draped in a sterile   fashion.  Her contractures make positioning somewhat difficult, but we were   able to position her without putting undue stress on her lower extremities.  The   22-Belgian cystoscope was advanced into the bladder.  There was a large amount of   clot, which was removed from the bladder with an Mitch evacuator.  This   required  about 30 minutes of irrigation.  Once the clot was removed, there was a   nodular tumor visible replacing most of the right side of the bladder and much   of the trigone.  There was active bleeding from the bladder neck.  This was   fulgurated with Bugbee cautery.  The majority of the bleeding was able to be   stopped; however, there was continuous ooze diffusely from the bladder wall,   likely due to the patient's Plavix.  Neither ureteral orifice was   identifiable.  Then, 5 mg of Lasix was administered.  Methylene blue and indigo   carmine were not available.  Approximately 45 minutes of observation were   performed and we did not see efflux from either ureteral orifice.  The scope was   removed and a 3-way Powers catheter was placed.  The catheter irrigated clearly   and the catheter was connected to continuous irrigation.  Bimanual examination was   performed.  There was a mass palpable on the right side of the bladder.  The   patient's abdomen remained soft and nondistended throughout the case.  She awoke   from anesthesia without difficulty and returned to the ICU.  Immediately   following the surgery, I was able to speak with her urologist, Dr. Veto Rice.  She does indeed have a history of invasive high-grade bladder cancer   and a year ago, she was deemed not able to tolerate curative therapy.  I spoke   with Dr. Oliver Rosario, the nephrologist and I spoke with Interventional Radiology   about nephrostomy tubes.  Dr. Roldan, the interventional radiologist and I   spoke with the patient's neighbor who has power of .  Based on my   assessment, I do not think there is any reasonable chance of curing her bladder   cancer.  Given her age and comorbidities, I think comfort care measures are   clearly the most appropriate.  Her neighbor who has power of  agrees   with this plan.  We will monitor her in the ICU over the weekend.  At this point   in time, we did not plan nephrostomy tube placement  or dialysis.  I will   communicate this with the primary team and we will begin end of life discussions   with the patient and her caregivers.  I explained that if they felt otherwise,   we certainly can change our plan if necessary.      JENNIFER/  dd: 06/16/2017 13:55:35 (CDT)  td: 06/16/2017 16:51:54 (CDT)  Doc ID   #[id number]  Job ID #[id number]    CC:

## 2017-06-16 NOTE — PLAN OF CARE
DC Planning:    Writer met with patient at bedside to discuss plan of care, DARWIN Berrios [phone] present as well. Per POA, patient has been residing at [facility] since 03/2015 and is wheel-chair bound. POA informed writer she signed release of information form to access medical records from [facility]. Per RUTH Chiu, treatment team has been attempting to obtain [place] from [place] since admission. Patient's PCP is Dr. Shaver per DARWIN. DARWIN also reports in addition to Medicaid as a secondary, patient also has a policy with [facility]. POA inquiring if [facility] will allow patient a bedside commode.    No needs expressed at this time.      06/16/17 1437   Discharge Assessment   Assessment Type Discharge Planning Assessment   Confirmed/corrected address and phone number on facesheet? Yes   Assessment information obtained from? Patient;Caregiver   Type of Healthcare Directive Received Durable power of  for health care   Prior to hospitilization cognitive status: Alert/Oriented   Prior to hospitalization functional status: Wheelchair Bound;Completely Dependent   Current cognitive status: Alert/Oriented   Current Functional Status: Completely Dependent;Wheelchair Bound   Arrived From admitted as an inpatient;nursing home   Lives With facility resident   Able to Return to Prior Arrangements yes   Is patient able to care for self after discharge? No   How many people do you have in your home that can help with your care after discharge? >4   Who are your caregiver(s) and their phone number(s)? DARWIN Armstrong, [phone]   Patient currently being followed by outpatient case management? No   Patient currently receives home health services? No   Does the patient currently use HME? Yes   Patient currently receives private duty nursing? N/A   Patient currently receives any other outside agency services? No   Equipment Currently Used at Home wheelchair   Do you have any problems affording any  of your prescribed medications? No   Is the patient taking medications as prescribed? yes   Do you have any financial concerns preventing you from receiving the healthcare you need? No   Does the patient have transportation to healthcare appointments? Yes   Transportation Available agency transportation   Discharge Plan A Return to nursing home  ([facility])   Patient/Family In Agreement With Plan yes

## 2017-06-16 NOTE — ASSESSMENT & PLAN NOTE
- necessity unclear   - no reported h/o DVT/PE, recent stenting  - records requested from [facility]

## 2017-06-16 NOTE — ANESTHESIA POSTPROCEDURE EVALUATION
"Anesthesia Post Evaluation    Patient: Yamini Ellis    Procedure(s) Performed: Procedure(s) (LRB):  CYSTOSCOPY (N/A)  FULGURATION-BLADDER TUMOR (N/A)  EVACUATION-BLOOD CLOT (N/A)    Final Anesthesia Type: general  Patient location during evaluation: PACU  Patient participation: Yes- Able to Participate  Level of consciousness: awake and alert  Post-procedure vital signs: reviewed and stable  Pain management: adequate  Airway patency: patent  PONV status at discharge: No PONV  Anesthetic complications: no      Cardiovascular status: blood pressure returned to baseline  Respiratory status: unassisted and spontaneous ventilation  Hydration status: euvolemic  Follow-up not needed.        Visit Vitals  BP (!) 114/58   Pulse 80   Temp 36.4 °C (97.5 °F) (Oral)   Resp 18   Ht 5' 5" (1.651 m)   Wt 59.1 kg (130 lb 4.7 oz)   SpO2 100%   Breastfeeding? No   BMI 21.68 kg/m²       Pain/Elisha Score: Pain Assessment Performed: Yes (6/16/2017  3:12 AM)  Presence of Pain: denies (6/16/2017  3:12 AM)      "

## 2017-06-16 NOTE — ED NOTES
"Pt presents to ED from [facility], per EMS and nursing home report, pt with elevated BUN/Cr. Pt with no comlpaints at this time, states "I feel marvelous." Pt with PMH of stroke, with existing weakness to R side of body. Pt awake, alert, oriented to self, person, place, unable to state why she is here. Respirations even and unlabored. No acute distress noted. Pt denies pain at this time. Awaiting further orders. Pt updated on POC. Bed is locked and in lowest position with side rails up x2. Call bell within reach and pt oriented to use of call bell. Pt on continuous cardiac monitoring, continuous pulse ox, and continuous BP cuff. Will continue to monitor.     "

## 2017-06-16 NOTE — HOSPITAL COURSE
The patient was admitted for treatment of ARF (possibly in the presence of CKD), and hyperkalemia. The patient had no significant EKG changes.  Nephrology was consulted and hyperkalemia cocktail was administered in the ED. The patient was admitted to the ICU for close monitoring.     Underwent cystoscopy by Dr. Yo, clot evacuated as noted:       DATE OF PROCEDURE:  06/16/2017   SURGEON:  Ger Yo M.D.   PROCEDURES:  1.  Cystoscopy with examination under anesthesia.  2.  Clot evacuation.  3.  Fulguration of bladder tumor, large.  4.  Attempted ureteral stent placement.  Neither ureteral orifice was not identifiable.  Then, 5 mg of Lasix was administered.  Approximately 45 minutes of observation were performed and we did not see efflux from either ureteral orifice. Bimanual examination was performed.  There was a mass palpable on the right side of the bladder.  Immediately following the surgery, I was able to speak with her urologist, Dr. Veto Rice.  She does indeed have a history of invasive high-grade bladder cancer and a year ago, she was deemed not able to tolerate curative therapy.  I spoke with Dr. Oliver Rosario, the nephrologist and I spoke with Interventional Radiology about nephrostomy tubes.  Dr. Roldan, the interventional radiologist and I spoke with the patient's neighbor who has power of .  Based on my   assessment, I do not think there is any reasonable chance of curing her bladder cancer.  Given her age and comorbidities, I think comfort care measures are   clearly the most appropriate.     Plavix is discontinued.  Contributing to bleeding, and therefore likely clot formation and urinary obstruction which contributed to acute renal failure.      After bladder irrigation by Dr. Yo, patient may have had some relief of obstruction as evidenced by decrease in creatinine from approximately 10 to 5.      As discussed with Dr. Yo, patient likely to have progressive symptoms due to  spread of bladder CA, and agree patient is extremely poor surgical or intervention candidate.     After extensive discussion with family and POA, it was agreed that it was most likely in patient's best interest to discharge the patient to inpatient hospice.   Nephrology and interventional radiology in agreement as well.      Will discharge patient to hospice.

## 2017-06-16 NOTE — CONSULTS
Consult Note  Nephrology    Consult Requested By: Joaquin Simeon MD    Reason for Consult: WANDER/hyperkalemia    SUBJECTIVE:     History of Present Illness: 87 y.o. female who presented to the ED from [facility] where she lives, at her PCP's request (Dr. Shaver) for treatment of abnormal labs. The patient is a poor historian, and most information was gathered from Chart. Her POA is Lottie Berrios ([phone] or [phone]). Upon evaluation in the ED she was found to have K of 6.5, BUN of 118 & Cr of 14.0.    Past Medical History:   Diagnosis Date    Anemia of decreased vitamin B12 absorption     Aphasia     Coronary atherosclerosis of unspecified type of vessel, native or graft     Epilepsy     Hemiparesis affecting right side as late effect of cerebrovascular accident     Hypertension     Hypokalemia     Hypothyroidism     Other and unspecified hyperlipidemia     Stroke     Transient ischemic attack (TIA), and cerebral infarction without residual deficits     Unspecified hypothyroidism      History reviewed. No pertinent surgical history.  History reviewed. No pertinent family history.  Social History   Substance Use Topics    Smoking status: Former Smoker    Smokeless tobacco: Not on file    Alcohol use No       Review of patient's allergies indicates:   Allergen Reactions    Lactose         Review of Systems:  Unable to obtain given dementia    OBJECTIVE:     Vital Signs Range (Last 24H):  Temp:  [97.5 °F (36.4 °C)-98.1 °F (36.7 °C)]   Pulse:  []   Resp:  [15-23]   BP: (114-153)/(57-69)   SpO2:  [99 %-100 %]     Physical Exam:  General- Patient alert  in NAD  HEENT- PERRLA, EOMI, OP clear, MMM  Neck- No JVD, Lymphadenopathy, Thyromegaly  CV- Regular rate and rhythm, No Murmur/jitendra/rubs  Resp- Lungs CTA Bilaterally, No increased WOB  GI- Non tender/non-distended,   Extrem- No cyanosis, clubbing, edema.   Derm- No rashes, masses, or lesions noted       Body mass index is 21.68  kg/m².    Laboratory:  Reviewed    Diagnostic Results:  Reviewed      ASSESSMENT/PLAN:     Active Hospital Problems    Diagnosis  POA    *Acute renal failure [N17.9]  Yes    Current use of anticoagulant therapy [Z79.01]  Not Applicable    Hyperkalemia [E87.5]  Yes    CAD (coronary artery disease) [I25.10]  Yes    Essential hypertension [I10]  Yes      Resolved Hospital Problems    Diagnosis Date Resolved POA   No resolved problems to display.     WANDER on unknown baseline renal function  -US kidneys with right hydro. Urology consulted  -vail with moisés blood. Urology consulted  -Urine cr not sent but urine NA not c/w dehydration or prerenal state.  -nephrotic range proteinuria present. Will send routine w/u  -no need for emergernt HD at this time.  Rec: continue IV hydration to flush hematuria    Hyperkalemia  -tx with fluids and diuretics.   -recheck in AM  -may be related to obstructive uropathy.    Metabolic acidosis with mild GAP  -likely due to WANDER.    Secondary hyperparathyroidism  -hold tx for now.    Anemia  -TSH wnl. No need for transfusion.  -check iron and myeloma w/u    Thank you for allowing us to participate in the care of your patient. We will follow the patient and provide recommendations as needed.      Time spent seeing patient( greater than 1/2 spent in direct contact) : 120

## 2017-06-16 NOTE — CONSULTS
[facility]  Urology  Consult Note    Patient Name: Yamini Ellis  MRN: [id number]  Admission Date: 6/15/2017  Hospital Length of Stay: 1   Code Status: Full Code   Attending Provider: Joaquin Simeon MD   Consulting Provider: Ger Yo MD  Primary Care Physician: Primary Doctor No  Principal Problem:Acute renal failure    Consults    Subjective:     HPI:  87 female  Poor historian  Apparently has history of bladder ca followed by Dr Rice at [facility]  Transferred to [facility] ED with gross hematuria  Pt is on plavix    US showed bilateral hydro and large amount of bloody urine drained from bladder  16f vail is draining but cr has not improved with vail drainage    Renal is following    No new subjective & objective note has been filed under this hospital service since the last note was generated.      Assessment and Plan:     Malignant neoplasm of overlapping sites of bladder    Cysto B RPG and EUA now  If recurrence is present, staged TURBT may be necessary because full TUR may not be possible while on plavix  [facility] available at this time[facility]        Hydronephrosis    Cysto bilateral stents now        Hematuria    Hold anticoagulants  Cysto and clot evac and possible fulguration/TUR now    Obtained consent from POA        Current use of anticoagulant therapy    Hold due to active bleeding        * Acute renal failure    Renal following  Cysto and bilateral stents and clot evac now            VTE Risk Mitigation         Ordered     Medium Risk of VTE  Once      06/15/17 1924     Place sequential compression device  Until discontinued      06/15/17 1924     Place SP hose  Until discontinued      06/15/17 1924          Thank you for your consult. -    Ger Yo MD  Urology  [facility]

## 2017-06-16 NOTE — ANESTHESIA PREPROCEDURE EVALUATION
06/16/2017  Yamini Ellis is a 87 y.o., female.    Anesthesia Evaluation    I have reviewed the Patient Summary Reports.    I have reviewed the Nursing Notes.   I have reviewed the Medications.     Review of Systems  Anesthesia Hx:  Denies Family Hx of Anesthesia complications.   Denies Personal Hx of Anesthesia complications.   Social:  Non-Smoker    Hematology/Oncology:         -- Anemia (mild): Current/Recent Cancer. (Bladder)   Cardiovascular:   Hypertension CAD   hyperlipidemia    Pulmonary:   Pt denies SOB   Renal/:   Chronic Renal Disease, ARF K now 5.4, down from 6.5 on admit.  Pt with bilateral hydronephrosis   Neurological:   CVA Seizures    Endocrine:   Hypothyroidism        Physical Exam  General:  Cachexia    Airway/Jaw/Neck:  Airway Findings: Mouth Opening: Normal Tongue: Normal  General Airway Assessment: Adult  Mallampati: II  TM Distance: Normal, at least 6 cm      Dental:  Dental Findings: Edentulous   Chest/Lungs:  Chest/Lungs Findings: Clear to auscultation         Mental Status:  Mental Status Findings:  Cooperative, Confusion         Anesthesia Plan  Type of Anesthesia, risks & benefits discussed:  Anesthesia Type:  general  Patient's Preference:   Intra-op Monitoring Plan:   Intra-op Monitoring Plan Comments:   Post Op Pain Control Plan:   Post Op Pain Control Plan Comments:   Induction:   IV  Beta Blocker:         Informed Consent:  Anesthesia consent signed with patient representative.  ASA Score: 4  emergent   Day of Surgery Review of History & Physical:    H&P update referred to the surgeon.         Ready For Surgery From Anesthesia Perspective.

## 2017-06-16 NOTE — ASSESSMENT & PLAN NOTE
Hold anticoagulants  Cysto and clot evac and possible fulguration/TUR now    Obtained consent from POA

## 2017-06-16 NOTE — ED NOTES
RN unavailable for report at this time. Bed remains in lowest position, side rails up x2, call light in reach. Pt instructed to call for assistance. Pt reports pain of 0/10. Pt soiled, urine with a mild trace of blood, cleaned and repositioned for comfort.

## 2017-06-16 NOTE — ED NOTES
Beatriz made aware of blood tinged urine with presence of blood clots and glucose of 76. Advised that a glucose order set will be put in, continue to monitor.

## 2017-06-16 NOTE — CONSULTS
[facility]  Urology  Consult Note    Patient Name: Yamini Ellis  MRN: [id number]  Admission Date: 6/15/2017  Hospital Length of Stay: 1   Code Status: Full Code   Attending Provider: Joaquin Simeon MD   Consulting Provider: Ger Yo MD  Primary Care Physician: Primary Doctor No  Principal Problem:Acute renal failure    Consults    Subjective:     HPI:  87 female  Poor historian  Apparently has history of bladder ca followed by Dr Rice at [facility]  Transferred to [facility] ED with gross hematuria  Pt is on plavix    US showed bilateral hydro and large amount of bloody urine drained from bladder  16f vail is draining but cr has not improved with vail drainage    Renal is following    No new subjective & objective note has been filed under this hospital service since the last note was generated.      Assessment and Plan:     Malignant neoplasm of overlapping sites of bladder    Cysto B RPG and EUA now  If recurrence is present, staged TURBT may be necessary because full TUR may not be possible while on plavix  [facility] available at this time)        Hydronephrosis    Cysto bilateral stents now        Hematuria    Hold anticoagulants  Cysto and clot evac and possible fulguration/TUR now    Obtained consent from POA        Current use of anticoagulant therapy    Hold due to active bleeding        * Acute renal failure    Renal following  Cysto and bilateral stents and clot evac now            VTE Risk Mitigation         Ordered     Medium Risk of VTE  Once      06/15/17 1924     Place sequential compression device  Until discontinued      06/15/17 1924     Place SP hose  Until discontinued      06/15/17 1924          Thank you for your consult. I will follow-up with patient. Please contact us if you have any additional questions.    Ger Yo MD  Urology  [facility]

## 2017-06-16 NOTE — SUBJECTIVE & OBJECTIVE
Past Medical History:   Diagnosis Date    Anemia of decreased vitamin B12 absorption     Aphasia     Coronary atherosclerosis of unspecified type of vessel, native or graft     Epilepsy     Hemiparesis affecting right side as late effect of cerebrovascular accident     Hypertension     Hypokalemia     Hypothyroidism     Other and unspecified hyperlipidemia     Stroke     Transient ischemic attack (TIA), and cerebral infarction without residual deficits     Unspecified hypothyroidism        History reviewed. No pertinent surgical history.    Review of patient's allergies indicates:   Allergen Reactions    Lactose        Family History     None          Social History Main Topics    Smoking status: Former Smoker    Smokeless tobacco: Not on file    Alcohol use No    Drug use: No    Sexual activity: Not on file       Review of Systems    Objective:     Temp:  [97.5 °F (36.4 °C)-98.1 °F (36.7 °C)] 97.5 °F (36.4 °C)  Pulse:  [] 80  Resp:  [15-23] 18  SpO2:  [99 %-100 %] 100 %  BP: (114-153)/(57-69) 114/58     Body mass index is 21.68 kg/m².      Date 06/16/17 0700 - 06/17/17 0659   Shift 0940-5906 7747-9150 8504-0651 24 Hour Total   I  N  T  A  K  E   I.V.  (mL/kg) 818.3  (13.8)   818.3  (13.8)    Shift Total  (mL/kg) 818.3  (13.8)   818.3  (13.8)   O  U  T  P  U  T   Shift Total  (mL/kg)       Weight (kg) 59.1 59.1 59.1 59.1          Drains     Drain                 Urethral Catheter 06/15/17 2021 Non-latex 16 Fr. less than 1 day                Physical Exam    Significant Labs:    BMP:    Recent Labs  Lab 06/15/17  1802 06/15/17  2246 06/16/17  0446    142 144   K 6.5* 5.1 5.4*    109 111*   CO2 19* 15* 17*   * 102* 92*   CREATININE 14.0* 11.8* 10.3*   CALCIUM 8.7 9.0 9.1       CBC:    Recent Labs  Lab 06/15/17  1656 06/15/17  2322 06/16/17  0446   WBC 7.67 8.58 6.39   HGB 9.9* 10.4* 10.4*   HCT 27.5* 29.1* 29.0*    262 287       Urine Culture: No results for input(s):  LABURIN in the last 168 hours.    Significant Imaging:  US shows bilateral hydro

## 2017-06-16 NOTE — PROGRESS NOTES
Yamini Ellis is an 86 yo F with high grade bladder cancer and newly discovered hydronephrosis, severe on R, mild on L.  Recently underwent cystoscopy with significant bladder disease and active bladder bleeding, treated by Dr. Yo.  Tumor obscures bilateral ureteral orifices.  Case discussed with Dr. Yo and patient's POA.  Of note the patient also has severe contractures, which would make positioning difficult.       We will not attempt nephrostomy tube placement at this time as the patient has been on Plavix, which puts her at significant risk of bleeding.  Plavix is being held, and we will reevaluate her condition in a few days to see if nephrostomy tubes are necessary, but likely all efforts will be palliative from here on out.

## 2017-06-16 NOTE — CONSULTS
[facility]  Urology  Consult Note    Patient Name: Yamini Ellis  MRN: [id number]  Admission Date: 6/15/2017  Hospital Length of Stay: 1   Code Status: Full Code   Attending Provider: Joaquin Simeon MD   Consulting Provider: Ger Yo MD  Primary Care Physician: Primary Doctor No  Principal Problem:Acute renal failure    Consults    Subjective:     HPI:  87 female  Poor historian  Apparently has history of bladder ca followed by Dr Rice at [facility]  Transferred to OB ED with gross hematuria  Pt is on plavix    US showed bilateral hydro and large amount of bloody urine drained from bladder  16f vail is draining but cr has not improved with vail drainage    Renal is following    Past Medical History:   Diagnosis Date    Anemia of decreased vitamin B12 absorption     Aphasia     Coronary atherosclerosis of unspecified type of vessel, native or graft     Epilepsy     Hemiparesis affecting right side as late effect of cerebrovascular accident     Hypertension     Hypokalemia     Hypothyroidism     Other and unspecified hyperlipidemia     Stroke     Transient ischemic attack (TIA), and cerebral infarction without residual deficits     Unspecified hypothyroidism        History reviewed. No pertinent surgical history.    Review of patient's allergies indicates:   Allergen Reactions    Lactose        Family History     None          Social History Main Topics    Smoking status: Former Smoker    Smokeless tobacco: Not on file    Alcohol use No    Drug use: No    Sexual activity: Not on file       Review of Systems    Objective:     Temp:  [97.5 °F (36.4 °C)-98.1 °F (36.7 °C)] 97.5 °F (36.4 °C)  Pulse:  [] 80  Resp:  [15-23] 18  SpO2:  [99 %-100 %] 100 %  BP: (114-153)/(57-69) 114/58     Body mass index is 21.68 kg/m².      Date 06/16/17 0700 - 06/17/17 0659   Shift 7723-4483 0021-5964 1692-1954 24 Hour Total   I  N  T  A  K  E   I.V.  (mL/kg) 818.3  (13.8)   818.3  (13.8)     Shift Total  (mL/kg) 818.3  (13.8)   818.3  (13.8)   O  U  T  P  U  T   Shift Total  (mL/kg)       Weight (kg) 59.1 59.1 59.1 59.1          Drains     Drain                 Urethral Catheter 06/15/17 2021 Non-latex 16 Fr. less than 1 day                Physical Exam    Significant Labs:    BMP:    Recent Labs  Lab 06/15/17  1802 06/15/17  2246 06/16/17  0446    142 144   K 6.5* 5.1 5.4*    109 111*   CO2 19* 15* 17*   * 102* 92*   CREATININE 14.0* 11.8* 10.3*   CALCIUM 8.7 9.0 9.1       CBC:    Recent Labs  Lab 06/15/17  1656 06/15/17  2322 06/16/17  0446   WBC 7.67 8.58 6.39   HGB 9.9* 10.4* 10.4*   HCT 27.5* 29.1* 29.0*    262 287       Urine Culture: No results for input(s): LABURIN in the last 168 hours.    Significant Imaging:  US shows bilateral hydro                    Assessment and Plan:     Hydronephrosis    Cysto bilateral stents now        Hematuria    Hold anticoagulants  Cysto and clot evac and possible fulguration/TUR now    Obtained consent from POA        Current use of anticoagulant therapy    Hold due to active bleeding        * Acute renal failure    Renal following  Cysto and bilateral stents and clot evac now            VTE Risk Mitigation         Ordered     Medium Risk of VTE  Once      06/15/17 1924     Place sequential compression device  Until discontinued      06/15/17 1924     Place SP hose  Until discontinued      06/15/17 1924          Thank you for your consult. discussed in detail with POA.  She will speak with pt now and sign consents.  We discussed the high risk nature of this situation.  Discuss bladder perf, need for repeat surgery, continued bleeding etc.    Ger Yo MD  Urology  [facility]

## 2017-06-16 NOTE — ASSESSMENT & PLAN NOTE
Cysto B RPG and EUA now  If recurrence is present, staged TURBT may be necessary because full TUR may not be possible while on plavix  Placed call to Dr Rice to obatin additional information (no records available at this time)

## 2017-06-16 NOTE — H&P
"[facility] Hospital Medicine  History & Physical    Patient Name: Yamini Ellis  MRN: [id number]  Admission Date: 6/15/2017  Attending Physician: Joaquin Simeon MD   Primary Care Provider: No primary care provider on file.         Patient information was obtained from patient, caregiver / friend and ER records.     Subjective:     Principal Problem:Acute renal failure    Chief Complaint:   Chief Complaint   Patient presents with    Abnormal Lab     elevated BUN, creatinine and potassium, sent from [facility] by Sivakumar GUDINO        HPI: Ms. Yamini Ellis is a 87 y.o. female who presented to the ED from [facility] where she lives, at her PCP's request (Dr. Shaver) for treatment of abnormal labs. The patient is a poor historian, and most information was gathered from her POA [facility] ([phone] or [phone]). The patient herself has no complaints other than 10 lb weight loss over 2 months 2/2 "bland food" where she lives. Upon evaluation in the ED she was found to have K of 6.5, BUN of 118 & Cr of 14.0. She denies fatigue, chest pain, SOB, abdominal pain, nausea, vomiting, diarrhea, constipation, muscle weakness, pruritus, leg swelling. Her POA, at bedside, endorses the patient is "not herself, less talkative than usual." [facility] records requested for [facility].    Past Medical History:   Diagnosis Date    Anemia of decreased vitamin B12 absorption     Aphasia     Coronary atherosclerosis of unspecified type of vessel, native or graft     Epilepsy     Hemiparesis affecting right side as late effect of cerebrovascular accident     Hypertension     Hypokalemia     Hypothyroidism     Other and unspecified hyperlipidemia     Stroke     Transient ischemic attack (TIA), and cerebral infarction without residual deficits     Unspecified hypothyroidism        History reviewed. No pertinent surgical history.    Review of patient's allergies indicates:   Allergen Reactions    Lactose  "       No current facility-administered medications on file prior to encounter.      Current Outpatient Prescriptions on File Prior to Encounter   Medication Sig    atorvastatin (LIPITOR) 20 MG tablet Take 20 mg by mouth every evening.    clopidogrel (PLAVIX) 75 mg tablet Take 75 mg by mouth once daily.    cyanocobalamin (VITAMIN B-12) 1000 MCG tablet Take 100 mcg by mouth once daily.    levetiracetam (KEPPRA) 500 MG Tab Take 250 mg by mouth 2 (two) times daily.     mirabegron (MYRBETRIQ) 25 mg Tb24 ER tablet Take 25 mg by mouth once daily.    potassium bicarb-citric acid 10 mEq TbEF Take 0.5 tablets by mouth once daily.     Family History     None        Social History Main Topics    Smoking status: Former Smoker    Smokeless tobacco: Not on file    Alcohol use No    Drug use: No    Sexual activity: Not on file     Review of Systems   Constitutional: Positive for appetite change (decreased). Negative for activity change, chills, diaphoresis, fatigue and fever.   Respiratory: Negative for cough, shortness of breath and wheezing.    Cardiovascular: Negative for chest pain, palpitations and leg swelling.   Gastrointestinal: Negative for abdominal pain, constipation, diarrhea, nausea and vomiting.   Genitourinary: Negative for dysuria, flank pain, frequency, hematuria and urgency.   Musculoskeletal: Negative for back pain, gait problem, joint swelling, myalgias, neck pain and neck stiffness.   Skin: Negative for color change, pallor, rash and wound.   Neurological: Positive for weakness. Negative for tremors, speech difficulty and headaches.   Psychiatric/Behavioral: Negative for confusion.     Objective:     Vital Signs (Most Recent):  Temp: 98.1 °F (36.7 °C) (06/15/17 1641)  Pulse: 96 (06/15/17 2047)  Resp: 15 (06/15/17 2018)  BP: (!) 151/67 (06/15/17 2023)  SpO2: 100 % (06/15/17 2047) Vital Signs (24h Range):  Temp:  [98.1 °F (36.7 °C)] 98.1 °F (36.7 °C)  Pulse:  [72-96] 96  Resp:  [15-18] 15  SpO2:  [99  %-100 %] 100 %  BP: (146-153)/(67-69) 151/67     Weight: 59.1 kg (130 lb 4.7 oz)  Body mass index is 21.68 kg/m².    Physical Exam   Constitutional: She is oriented to person, place, and time. She appears well-developed and well-nourished. No distress.   Thin, elderly female recumbent in semi-fetal position.    HENT:   Head: Normocephalic and atraumatic.   Oral mucous membranes mildly dry.   Eyes: Conjunctivae and EOM are normal. Pupils are equal, round, and reactive to light. No scleral icterus.   Neck: Normal range of motion. Neck supple. No JVD present. No tracheal deviation present.   Cardiovascular: Normal rate, regular rhythm, normal heart sounds and intact distal pulses.  Exam reveals no gallop and no friction rub.    No murmur heard.  2+ distal radial/DT/PT pulses. No carotid bruits.     Pulmonary/Chest: Effort normal and breath sounds normal. No stridor. No respiratory distress. She has no wheezes. She has no rales.   Abdominal: Soft. Bowel sounds are normal. She exhibits no distension and no mass. There is no tenderness. There is no guarding.   Musculoskeletal: Normal range of motion. She exhibits no deformity.   Neurological: She is alert and oriented to person, place, and time. No cranial nerve deficit. She exhibits normal muscle tone.   Alert and oriented to self, location. Often avoids answering questions by switching topics.    Skin: Skin is warm and dry. No rash noted. She is not diaphoretic. No erythema. No pallor.   Extremities appear dry.   Psychiatric: She has a normal mood and affect. Her behavior is normal. Judgment and thought content normal.   Nursing note and vitals reviewed.       Significant Labs:     CBC:   Recent Labs  Lab 06/15/17  1656   WBC 7.67   HGB 9.9*   HCT 27.5*        CMP:   Recent Labs  Lab 06/15/17  1802      K 6.5*      CO2 19*   GLU 89   *   CREATININE 14.0*   CALCIUM 8.7   ANIONGAP 17*   EGFRNONAA 2*     All pertinent labs within the past 24 hours  have been reviewed.    Significant Imaging: I have reviewed all pertinent imaging results/findings within the past 24 hours.   - No imaging tests available at time of exam.     Assessment/Plan:     Current use of anticoagulant therapy    - necessity unclear   - no reported h/o DVT/PE, recent stenting  - records requested from [facility]          Essential hypertension    - PRN hydralazine ordered   - no current home meds listed         CAD (coronary artery disease)    - Home meds:   - atorvastatin 20 mg QHS            Hyperkalemia    - no EKG changes   - Hyperkalemia cocktail administered   - monitor with q2h BMPs   - likely 2/2 potassium use combined with ARF           * Acute renal failure    - etiology unclear  - ? Acute on chronic?   - Nephrology consulted  - address hyperkalemia and monitor  - avoid nephrotoxins, renally dose medications   - diuresis initiated             VTE Risk Mitigation         Ordered     Medium Risk of VTE  Once      06/15/17 1924     Place sequential compression device  Until discontinued      06/15/17 1924     Place SP hose  Until discontinued      06/15/17 1924        RINA LearyC  Department of Hospital Medicine   [facility]

## 2017-06-16 NOTE — PROGRESS NOTES
Discussed with Dr. Yo, patient with obstruction likely from increasing bladder CA burden, and agree, given age and co morbidities, that palliative care is a reasonable option.  Patient on Plavix and has hematuria, and increased bleeding risk, so this currently limits options such as nephrostomy tubes.      Patient expressed a wish when I was in room of no dialysis.  Agree that palliative/hospice route may be most appropriate course.  Discussed with Lottie GRANADOS at bedside who has expressed agreement.  Patient's daughter Silvia Lema is the medical POA, and will be coming to hospital this afternoon.      Social work consult.

## 2017-06-16 NOTE — NURSING
2115 Pt arrived to unit. Pt states she is scared. Friend is fetched from waiting area. Urine is red and cloudy. EICU notified. Heparin DCed. Orders to consult urology. SCDs applied for VTE prophylaxis.   2315 Urine output adequate but filled with clots.  0400 Urine output appears blocked. EICU notified. Instructed to gently, manually flush vali. Will continue to monitor.

## 2017-06-16 NOTE — ASSESSMENT & PLAN NOTE
- etiology unclear  - ? Acute on chronic?   - Nephrology consulted  - address hyperkalemia and monitor  - avoid nephrotoxins, renally dose medications   - diuresis initiated

## 2017-06-16 NOTE — H&P
History & Physical  Hospital Medicine      SUBJECTIVE:     Chief Complaint/Reason for Admission:  Abnormal labs    History of Present Illness:  Patient is a 87 y.o. female presents from nursing home with abnormal labs.  Patient in acute renal failure (unknown baseline) with , creatinine 14, potassium 6.5.  ED gave IVF and treatment for hyperkalemia and potassium now 5.4.       Patient has no complaints what so ever, and doesn't believe that something is wrong with her kidneys.  Powers catheter with bloody urine.      Patient somewhat poor historian, indicates doctors are out just for themselves.  Insists she doesn't believe anything is wrong with her kidneys.  States her POA is her friend Lottie Garcia.            PTA Medications   Medication Sig    atorvastatin (LIPITOR) 20 MG tablet Take 20 mg by mouth every evening.    clopidogrel (PLAVIX) 75 mg tablet Take 75 mg by mouth once daily.    cyanocobalamin (VITAMIN B-12) 1000 MCG tablet Take 100 mcg by mouth once daily.    levetiracetam (KEPPRA) 500 MG Tab Take 250 mg by mouth 2 (two) times daily.     mirabegron (MYRBETRIQ) 25 mg Tb24 ER tablet Take 25 mg by mouth once daily.    potassium bicarb-citric acid 10 mEq TbEF Take 0.5 tablets by mouth once daily.       Review of patient's allergies indicates:   Allergen Reactions    Lactose         Past Medical History:   Diagnosis Date    Anemia of decreased vitamin B12 absorption     Aphasia     Coronary atherosclerosis of unspecified type of vessel, native or graft     Epilepsy     Hemiparesis affecting right side as late effect of cerebrovascular accident     Hypertension     Hypokalemia     Hypothyroidism     Other and unspecified hyperlipidemia     Stroke     Transient ischemic attack (TIA), and cerebral infarction without residual deficits     Unspecified hypothyroidism      History reviewed. No pertinent surgical history.  History reviewed. No pertinent family history.  Social History    Substance Use Topics    Smoking status: Former Smoker    Smokeless tobacco: Not on file    Alcohol use No       Review of Systems:  Constitutional: no fever or chills  Eyes: no visual changes  ENT: no nasal congestion or sore throat  Respiratory: no cough or shortness of breath  Cardiovascular: no chest pain or palpitations  Gastrointestinal: no nausea or vomiting, no abdominal pain or change in bowel habits  Genitourinary: positive for hematuria  Integument/Breast: no rash or pruritis  Hematologic/Lymphatic: no easy bruising or lymphadenopathy  Musculoskeletal: no arthralgias or myalgias  Neurological: no seizures or tremors  Behavioral/Psych: no auditory or visual hallucinations  Endocrine: no heat or cold intolerance    OBJECTIVE:     Vital Signs (Most Recent)  Temp: 97.5 °F (36.4 °C) (06/15/17 2330)  Pulse: 80 (06/16/17 0312)  Resp: 18 (06/16/17 0300)  BP: (!) 114/58 (06/16/17 0300)  SpO2: 100 % (06/16/17 0312)    Physical Exam:  General: well developed, well nourished, no distress  Head: normocephalic, atraumatic  Eyes:  conjunctivae/corneas clear. PERRL.  Throat: OP clear  Neck: supple, symmetrical, trachea midline, no JVD and thyroid not enlarged, symmetric, no tenderness/mass/nodules  Lungs:  clear to auscultation bilaterally and normal respiratory effort  Heart: regular rate and rhythm, S1, S2 normal, no murmur, click, rub or gallop  Abdomen: soft, non-tender non-distented; bowel sounds normal; no masses,  no organomegaly  Extremities: no cyanosis or edema, or clubbing  Skin: Skin color, texture, turgor normal. No rashes or lesions  Lymph Nodes: No cervical or supraclavicular adenopathy  Neurologic: Slight left facial droop.  Appears to have some weakness of LUE with abnormal hand wrist flexion. Exam somewhat limited due to patient cooperation.      Laboratory  CBC:     Recent Labs  Lab 06/16/17  0446   WBC 6.39   RBC 3.35*   HGB 10.4*   HCT 29.0*      MCV 87   MCH 31.0   MCHC 35.9     CMP:      Recent Labs  Lab 06/16/17  0446      CALCIUM 9.1      K 5.4*   CO2 17*   *   BUN 92*   CREATININE 10.3*       Recent Labs  Lab 06/15/17  1725   COLORU Red*   SPECGRAV <=1.005*   PHUR 7.0   PROTEINUA 2+*   BACTERIA Occasional   NITRITE Negative   LEUKOCYTESUR Trace*   UROBILINOGEN Negative   HYALINECASTS 0       Diagnostic Results:  CT HEAD WO 6/15/17:  IMPRESSION:  Multiple remote supratentorial and infratentorial infarcts with age-indeterminate left cerebellar infarct.    Right frontal scalp soft tissue thickening. No fracture or intracranial hemorrhage.    Cerebral volume loss and findings of chronic microvascular ischemic disease.      CXR 6/15/17:  IMPRESSION:  No acute cardiothoracic disease.      RENAL US 6/15/17:  Bilaterally the kidneys are slightly small and contain multiple cysts. They measure 8.7 cm on the right and 9.1 cm on the left. Resistive indices are slightly elevated bilaterally, 0.70 on the right and 0.77 the left. On the right, there is severe hydronephrosis. There is mild hydronephrosis on the left.  The bladder is decompressed by Powers catheter.  BLADDER:  Unremarkable.  IMPRESSION:  Severe right and mild left hydronephrosis.      EKG: SR; no acute ST change    ASSESSMENT/PLAN:     Active Hospital Problems    Diagnosis  POA    *Acute renal failure [N17.9]  Yes    Anemia [D64.9]  Yes    Epilepsy [G40.909]  Yes    Metabolic acidosis, normal anion gap (NAG) [E87.2]  Yes    Hyperphosphatemia [E83.39]  Yes    Hematuria [R31.9]  Yes    Hydronephrosis [N13.30]  Yes    Hyperkalemia [E87.5]  Yes    CAD (coronary artery disease) [I25.10]  Yes    Essential hypertension [I10]  Yes      Resolved Hospital Problems    Diagnosis Date Resolved POA   No resolved problems to display.         Plan:     Acute renal failure / Hyperkalemia / NAGMA  - Nephrology following    Hydronephrosis R > L  - Urology to see     Hematuria  - Bloody urine in Powers  - Urology to see    H/o CAD /  CVA  - On Statin, Plavix at home.  Held.     Epilepsy  - Keppra    Hyperphosphatemia  - From kidney failure  - Monitor    Social work for dispo  - Contact DARWIN SNOWDEN SCD

## 2017-06-16 NOTE — PLAN OF CARE
Problem: Fall Risk (Adult)  Goal: Identify Related Risk Factors and Signs and Symptoms  Related risk factors and signs and symptoms are identified upon initiation of Human Response Clinical Practice Guideline (CPG)   Pt at risk of falls due to confusion and having a vail in place.

## 2017-06-16 NOTE — CONSULTS
[facility]  Urology  Consult Note    Patient Name: Yamini Ellis  MRN: [id number]  Admission Date: 6/15/2017  Hospital Length of Stay: 1   Code Status: Full Code   Attending Provider: Joaquin Simeon MD   Consulting Provider: Ger Yo MD  Primary Care Physician: Primary Doctor No  Principal Problem:Acute renal failure    Consults    Subjective:     HPI:  87 female  Poor historian  Apparently has history of bladder ca followed by Dr Rice at [facility]  Transferred to OB ED with gross hematuria  Pt is on plavix    US showed bilateral hydro and large amount of bloody urine drained from bladder  16f vail is draining but cr has not improved with vail drainage    Renal is following    No new subjective & objective note has been filed under this hospital service since the last note was generated.      Assessment and Plan:     Malignant neoplasm of overlapping sites of bladder    Cysto B RPG and EUA now  If recurrence is present, staged TURBT may be necessary because full TUR may not be possible while on plavix  [facility] available at this time[facility]        Hydronephrosis    Cysto bilateral stents now        Hematuria    Hold anticoagulants  Cysto and clot evac and possible fulguration/TUR now    Obtained consent from POA        Current use of anticoagulant therapy    Hold due to active bleeding        * Acute renal failure    Renal following  Cysto and bilateral stents and clot evac now            VTE Risk Mitigation         Ordered     Medium Risk of VTE  Once      06/15/17 1924     Place sequential compression device  Until discontinued      06/15/17 1924     Place SP hose  Until discontinued      06/15/17 1924          Thank you for your consult.    Ger Yo MD  Urology  [facility]

## 2017-06-16 NOTE — PROGRESS NOTES
History & Physical  Hospital Medicine      SUBJECTIVE:     Chief Complaint/Reason for Admission:  Abnormal labs    History of Present Illness:  Patient is a 87 y.o. female presents from nursing home with abnormal labs.  Patient in acute renal failure (unknown baseline) with , creatinine 14, potassium 6.5.  ED gave IVF and treatment for hyperkalemia and potassium now 5.4.       Patient has no complaints what so ever, and doesn't believe that something is wrong with her kidneys.  Powers catheter with bloody urine.      Patient somewhat poor historian, indicates doctors are out just for themselves.  Insists she doesn't believe anything is wrong with her kidneys.  States her POA is her friend Lottie Garcia.            PTA Medications   Medication Sig    atorvastatin (LIPITOR) 20 MG tablet Take 20 mg by mouth every evening.    clopidogrel (PLAVIX) 75 mg tablet Take 75 mg by mouth once daily.    cyanocobalamin (VITAMIN B-12) 1000 MCG tablet Take 100 mcg by mouth once daily.    levetiracetam (KEPPRA) 500 MG Tab Take 250 mg by mouth 2 (two) times daily.     mirabegron (MYRBETRIQ) 25 mg Tb24 ER tablet Take 25 mg by mouth once daily.    potassium bicarb-citric acid 10 mEq TbEF Take 0.5 tablets by mouth once daily.       Review of patient's allergies indicates:   Allergen Reactions    Lactose         Past Medical History:   Diagnosis Date    Anemia of decreased vitamin B12 absorption     Aphasia     Coronary atherosclerosis of unspecified type of vessel, native or graft     Epilepsy     Hemiparesis affecting right side as late effect of cerebrovascular accident     Hypertension     Hypokalemia     Hypothyroidism     Other and unspecified hyperlipidemia     Stroke     Transient ischemic attack (TIA), and cerebral infarction without residual deficits     Unspecified hypothyroidism      History reviewed. No pertinent surgical history.  History reviewed. No pertinent family history.  Social History    Substance Use Topics    Smoking status: Former Smoker    Smokeless tobacco: Not on file    Alcohol use No       Review of Systems:  Constitutional: no fever or chills  Eyes: no visual changes  ENT: no nasal congestion or sore throat  Respiratory: no cough or shortness of breath  Cardiovascular: no chest pain or palpitations  Gastrointestinal: no nausea or vomiting, no abdominal pain or change in bowel habits  Genitourinary: positive for hematuria  Integument/Breast: no rash or pruritis  Hematologic/Lymphatic: no easy bruising or lymphadenopathy  Musculoskeletal: no arthralgias or myalgias  Neurological: no seizures or tremors  Behavioral/Psych: no auditory or visual hallucinations  Endocrine: no heat or cold intolerance    OBJECTIVE:     Vital Signs (Most Recent)  Temp: 97.5 °F (36.4 °C) (06/15/17 2330)  Pulse: 80 (06/16/17 0312)  Resp: 18 (06/16/17 0300)  BP: (!) 114/58 (06/16/17 0300)  SpO2: 100 % (06/16/17 0312)    Physical Exam:  General: well developed, well nourished, no distress  Head: normocephalic, atraumatic  Eyes:  conjunctivae/corneas clear. PERRL.  Throat: OP clear  Neck: supple, symmetrical, trachea midline, no JVD and thyroid not enlarged, symmetric, no tenderness/mass/nodules  Lungs:  clear to auscultation bilaterally and normal respiratory effort  Heart: regular rate and rhythm, S1, S2 normal, no murmur, click, rub or gallop  Abdomen: soft, non-tender non-distented; bowel sounds normal; no masses,  no organomegaly  Extremities: no cyanosis or edema, or clubbing  Skin: Skin color, texture, turgor normal. No rashes or lesions  Lymph Nodes: No cervical or supraclavicular adenopathy  Neurologic: Slight left facial droop.  Appears to have some weakness of LUE with abnormal hand wrist flexion. Exam somewhat limited due to patient cooperation.      Laboratory  CBC:     Recent Labs  Lab 06/16/17  1253   WBC 6.19   RBC 2.94*   HGB 9.0*   HCT 25.7*      MCV 87   MCH 30.6   MCHC 35.0     CMP:      Recent Labs  Lab 06/16/17  0446 06/16/17  0811     --    CALCIUM 9.1  --    ALBUMIN  --  3.2*   PROT  --  7.8     --    K 5.4*  --    CO2 17*  --    *  --    BUN 92*  --    CREATININE 10.3*  --    ALKPHOS  --  57   ALT  --  20   AST  --  13   BILITOT  --  0.6       Recent Labs  Lab 06/15/17  1725   COLORU Red*   SPECGRAV <=1.005*   PHUR 7.0   PROTEINUA 2+*   BACTERIA Occasional   NITRITE Negative   LEUKOCYTESUR Trace*   UROBILINOGEN Negative   HYALINECASTS 0       Diagnostic Results:  CT HEAD WO 6/15/17:  IMPRESSION:  Multiple remote supratentorial and infratentorial infarcts with age-indeterminate left cerebellar infarct.    Right frontal scalp soft tissue thickening. No fracture or intracranial hemorrhage.    Cerebral volume loss and findings of chronic microvascular ischemic disease.      CXR 6/15/17:  IMPRESSION:  No acute cardiothoracic disease.      RENAL US 6/15/17:  Bilaterally the kidneys are slightly small and contain multiple cysts. They measure 8.7 cm on the right and 9.1 cm on the left. Resistive indices are slightly elevated bilaterally, 0.70 on the right and 0.77 the left. On the right, there is severe hydronephrosis. There is mild hydronephrosis on the left.  The bladder is decompressed by Powers catheter.  BLADDER:  Unremarkable.  IMPRESSION:  Severe right and mild left hydronephrosis.      EKG: SR; no acute ST change    ASSESSMENT/PLAN:     Active Hospital Problems    Diagnosis  POA    *Acute renal failure [N17.9]  Yes    Anemia [D64.9]  Yes    Epilepsy [G40.909]  Yes    Metabolic acidosis, normal anion gap (NAG) [E87.2]  Yes    Hyperphosphatemia [E83.39]  Yes    Hematuria [R31.9]  Yes    Hydronephrosis [N13.30]  Yes    Current use of anticoagulant therapy [Z79.01]  Not Applicable    Malignant neoplasm of overlapping sites of bladder [C67.8]  Yes    History of bladder cancer [Z85.51]  Not Applicable    Hyperkalemia [E87.5]  Yes    CAD (coronary artery disease)  [I25.10]  Yes    Essential hypertension [I10]  Yes      Resolved Hospital Problems    Diagnosis Date Resolved POA   No resolved problems to display.         Plan:     Acute renal failure / Hyperkalemia / NAGMA  - Nephrology following    Hydronephrosis R > L  - Urology to see     Hematuria  - Bloody urine in Powers  - Urology to see    H/o CAD / CVA  - On Statin, Plavix at home.  Held.     Epilepsy  - Keppra    Hyperphosphatemia  - From kidney failure  - Monitor    Social work for dispo  - Contact DARWIN SNOWDEN SCD

## 2017-06-17 VITALS
RESPIRATION RATE: 18 BRPM | WEIGHT: 130.31 LBS | SYSTOLIC BLOOD PRESSURE: 131 MMHG | HEART RATE: 98 BPM | HEIGHT: 65 IN | DIASTOLIC BLOOD PRESSURE: 63 MMHG | BODY MASS INDEX: 21.71 KG/M2 | OXYGEN SATURATION: 97 % | TEMPERATURE: 98 F

## 2017-06-17 PROBLEM — E87.5 HYPERKALEMIA: Status: RESOLVED | Noted: 2017-06-15 | Resolved: 2017-06-17

## 2017-06-17 LAB
ALLENS TEST: ABNORMAL
ANION GAP SERPL CALC-SCNC: 13 MMOL/L
ANION GAP SERPL CALC-SCNC: 14 MMOL/L
BACTERIA UR CULT: NO GROWTH
BNP SERPL-MCNC: 60 PG/ML
BUN SERPL-MCNC: 58 MG/DL
BUN SERPL-MCNC: 60 MG/DL
CALCIUM SERPL-MCNC: 9 MG/DL
CALCIUM SERPL-MCNC: 9.3 MG/DL
CHLORIDE SERPL-SCNC: 117 MMOL/L
CHLORIDE SERPL-SCNC: 118 MMOL/L
CO2 SERPL-SCNC: 15 MMOL/L
CO2 SERPL-SCNC: 18 MMOL/L
CREAT SERPL-MCNC: 5.4 MG/DL
CREAT SERPL-MCNC: 5.7 MG/DL
EST. GFR  (AFRICAN AMERICAN): 7 ML/MIN/1.73 M^2
EST. GFR  (AFRICAN AMERICAN): 8 ML/MIN/1.73 M^2
EST. GFR  (NON AFRICAN AMERICAN): 6 ML/MIN/1.73 M^2
EST. GFR  (NON AFRICAN AMERICAN): 7 ML/MIN/1.73 M^2
GLUCOSE SERPL-MCNC: 114 MG/DL
GLUCOSE SERPL-MCNC: 134 MG/DL
HCO3 UR-SCNC: 16.7 MMOL/L (ref 24–28)
MAGNESIUM SERPL-MCNC: 1.8 MG/DL
PCO2 BLDA: 33.7 MMHG (ref 35–45)
PH SMN: 7.3 [PH] (ref 7.35–7.45)
PHOSPHATE SERPL-MCNC: 4 MG/DL
PO2 BLDA: 100 MMHG (ref 80–100)
POC BE: -10 MMOL/L
POC SATURATED O2: 97 % (ref 95–100)
POTASSIUM SERPL-SCNC: 5.1 MMOL/L
POTASSIUM SERPL-SCNC: 5.2 MMOL/L
SAMPLE: ABNORMAL
SITE: ABNORMAL
SODIUM SERPL-SCNC: 147 MMOL/L
SODIUM SERPL-SCNC: 148 MMOL/L

## 2017-06-17 PROCEDURE — 83880 ASSAY OF NATRIURETIC PEPTIDE: CPT

## 2017-06-17 PROCEDURE — 36415 COLL VENOUS BLD VENIPUNCTURE: CPT

## 2017-06-17 PROCEDURE — 25000003 PHARM REV CODE 250: Performed by: INTERNAL MEDICINE

## 2017-06-17 PROCEDURE — 80048 BASIC METABOLIC PNL TOTAL CA: CPT | Mod: 91

## 2017-06-17 PROCEDURE — 25000003 PHARM REV CODE 250: Performed by: PHYSICIAN ASSISTANT

## 2017-06-17 PROCEDURE — 99233 SBSQ HOSP IP/OBS HIGH 50: CPT | Mod: ,,, | Performed by: HOSPITALIST

## 2017-06-17 PROCEDURE — 99900035 HC TECH TIME PER 15 MIN (STAT)

## 2017-06-17 PROCEDURE — 80048 BASIC METABOLIC PNL TOTAL CA: CPT

## 2017-06-17 PROCEDURE — 99233 SBSQ HOSP IP/OBS HIGH 50: CPT | Mod: ,,, | Performed by: UROLOGY

## 2017-06-17 PROCEDURE — 94761 N-INVAS EAR/PLS OXIMETRY MLT: CPT

## 2017-06-17 PROCEDURE — 83735 ASSAY OF MAGNESIUM: CPT

## 2017-06-17 PROCEDURE — 84100 ASSAY OF PHOSPHORUS: CPT

## 2017-06-17 RX ORDER — METOLAZONE 5 MG/1
10 TABLET ORAL ONCE
Status: COMPLETED | OUTPATIENT
Start: 2017-06-17 | End: 2017-06-17

## 2017-06-17 RX ADMIN — SODIUM CHLORIDE: 0.9 INJECTION, SOLUTION INTRAVENOUS at 12:06

## 2017-06-17 RX ADMIN — METOLAZONE 10 MG: 5 TABLET ORAL at 10:06

## 2017-06-17 RX ADMIN — LEVETIRACETAM 250 MG: 250 TABLET, FILM COATED ORAL at 08:06

## 2017-06-17 RX ADMIN — SODIUM CHLORIDE, PRESERVATIVE FREE 3 ML: 5 INJECTION INTRAVENOUS at 06:06

## 2017-06-17 RX ADMIN — FAMOTIDINE 20 MG: 10 INJECTION INTRAVENOUS at 08:06

## 2017-06-17 NOTE — DISCHARGE SUMMARY
"[facility] Hospital Medicine  Discharge Summary      Patient Name: Yamini Ellis  MRN: [id number]  Admission Date: 6/15/2017  Hospital Length of Stay: 2 days  Discharge Date and Time:  06/17/2017 1:59 PM  Attending Physician: Joaquin Simeon MD   Discharging Provider: Joaquin Simeon MD  Primary Care Provider: Primary Doctor No      HPI:   Ms. Yamini Ellis is a 87 y.o. female who presented to the ED from [facility] where she lives, at her PCP's request (Dr. Shaver) for treatment of abnormal labs. The patient is a poor historian, and most information was gathered from her POA [facility] ([phone]). The patient herself has no complaints other than 10 lb weight loss over 2 months 2/2 "[place] food" where she lives. Upon evaluation in the ED she was found to have K of 6.5, BUN of 118 & Cr of 14.0. She denies fatigue, chest pain, SOB, abdominal pain, nausea, vomiting, diarrhea, constipation, muscle weakness, pruritus, leg swelling. Her POA, at bedside, endorses the patient is "not herself, less talkative than usual." [facility] records requested for Tour.    Procedure(s) (LRB):  CYSTOSCOPY (N/A)  FULGURATION-BLADDER TUMOR (N/A)  EVACUATION-BLOOD CLOT (N/A)      Indwelling Lines/Drains at time of discharge:   Lines/Drains/Airways     Drain                 Urethral Catheter 06/16/17 1314 Latex;Triple-lumen 24 Fr. 1 day         Urethral Catheter 06/16/17 1436 Triple-lumen 24 Fr. less than 1 day              Hospital Course:   The patient was admitted for treatment of ARF (possibly in the presence of CKD), and hyperkalemia. The patient had no significant EKG changes.  Nephrology was consulted and hyperkalemia cocktail was administered in the ED. The patient was admitted to the ICU for close monitoring.     Underwent cystoscopy by Dr. Yo, clot evacuated as noted:       DATE OF PROCEDURE:  06/16/2017   SURGEON:  Ger Yo M.D.   PROCEDURES:  1.  Cystoscopy with examination under " anesthesia.  2.  Clot evacuation.  3.  Fulguration of bladder tumor, large.  4.  Attempted ureteral stent placement.  Neither ureteral orifice was not identifiable.  Then, 5 mg of Lasix was administered.  Approximately 45 minutes of observation were performed and we did not see efflux from either ureteral orifice. Bimanual examination was performed.  There was a mass palpable on the right side of the bladder.  Immediately following the surgery, I was able to speak with her urologist, Dr. Veto Rice.  She does indeed have a history of invasive high-grade bladder cancer and a year ago, she was deemed not able to tolerate curative therapy.  I spoke with Dr. Oliver Rosario, the nephrologist and I spoke with Interventional Radiology about nephrostomy tubes.  Dr. Roldan, the interventional radiologist and I spoke with the patient's neighbor who has power of .  Based on my   assessment, I do not think there is any reasonable chance of curing her bladder cancer.  Given her age and comorbidities, I think comfort care measures are   clearly the most appropriate.     Plavix is discontinued.  Contributing to bleeding, and therefore likely clot formation and urinary obstruction which contributed to acute renal failure.      After bladder irrigation by Dr. Yo, patient may have had some relief of obstruction as evidenced by decrease in creatinine from approximately 10 to 5.      As discussed with Dr. Yo, patient likely to have progressive symptoms due to spread of bladder CA, and agree patient is extremely poor surgical or intervention candidate.     After extensive discussion with family and POA, it was agreed that it was most likely in patient's best interest to discharge the patient to inpatient hospice.   Nephrology and interventional radiology in agreement as well.      Will discharge patient to hospice.         Consults:   Consults         Status Ordering Provider     Inpatient consult to Nephrology-[facility]   Once     Provider:  Micheal Bates MD    Completed SEN DORSEY     Inpatient consult to Social Work/Case Management  Once     Provider:  (Not yet assigned)    Completed SEN DORSEY     Inpatient consult to Social Work/Case Management  Once     Provider:  (Not yet assigned)    Acknowledged SEN DORSEY     Inpatient consult to Urology  Once     Provider:  Cam Hernandes MD    Acknowledged SEN DORSEY          Significant Diagnostic Studies:   CT HEAD WO 6/15/17:  IMPRESSION:  Multiple remote supratentorial and infratentorial infarcts with age-indeterminate left cerebellar infarct.    Right frontal scalp soft tissue thickening. No fracture or intracranial hemorrhage.    Cerebral volume loss and findings of chronic microvascular ischemic disease.        CXR 6/15/17:  IMPRESSION:  No acute cardiothoracic disease.        RENAL US 6/15/17:  Bilaterally the kidneys are slightly small and contain multiple cysts. They measure 8.7 cm on the right and 9.1 cm on the left. Resistive indices are slightly elevated bilaterally, 0.70 on the right and 0.77 the left. On the right, there is severe hydronephrosis. There is mild hydronephrosis on the left.  The bladder is decompressed by Powers catheter.  BLADDER:  Unremarkable.  IMPRESSION:  Severe right and mild left hydronephrosis.        EKG: SR; no acute ST change        Pending Diagnostic Studies:     Procedure Component Value Units Date/Time    JENNIFER [[id number]] Collected:  06/16/17 0811    Order Status:  Sent Lab Status:  In process Updated:  06/16/17 1043    Specimen:  Blood from Blood     Anti-neutrophilic cytoplasmic antibody [[id number]] Collected:  06/16/17 0811    Order Status:  Sent Lab Status:  In process Updated:  06/16/17 1038    Specimen:  Blood from Blood     Comprehensive metabolic panel [[id number]] Collected:  06/15/17 1656    Order Status:  Sent Lab Status:  In process Updated:  06/15/17 1702    Specimen:  Blood from Blood     Immunofixation  electrophoresis [[id number]] Collected:  06/16/17 0811    Order Status:  Sent Lab Status:  In process Updated:  06/16/17 1043    Specimen:  Blood from Blood     Immunoglobulin free LT chains blood [[id number]] Collected:  06/16/17 0811    Order Status:  Sent Lab Status:  In process Updated:  06/16/17 1043    Specimen:  Blood from Blood     Immunoglobulin free LT chains blood [[id number]] Collected:  06/16/17 0811    Order Status:  Sent Lab Status:  In process Updated:  06/16/17 1043    Specimen:  Blood from Blood     Immunoglobulin free LT chains blood [[id number]] Collected:  06/16/17 0811    Order Status:  Sent Lab Status:  In process Updated:  06/16/17 1043    Specimen:  Blood from Blood     Magnesium [[id number]] Collected:  06/15/17 1656    Order Status:  Sent Lab Status:  In process Updated:  06/15/17 1702    Specimen:  Blood from Blood     Phospholipase A2 Receptor AB, Serum [[id number]] Collected:  06/16/17 0811    Order Status:  Sent Lab Status:  In process Updated:  06/16/17 1042    Specimen:  Blood     Phosphorus [[id number]] Collected:  06/15/17 1656    Order Status:  Sent Lab Status:  In process Updated:  06/15/17 1702    Specimen:  Blood from Blood         Final Active Diagnoses:    Diagnosis Date Noted POA    PRINCIPAL PROBLEM:  Acute renal failure [N17.9] 06/15/2017 Yes    Anemia [D64.9] 06/16/2017 Yes    Nonintractable epilepsy without status epilepticus [G40.909] 06/16/2017 Yes    Metabolic acidosis, normal anion gap (NAG) [E87.2] 06/16/2017 Yes    Hyperphosphatemia [E83.39] 06/16/2017 Yes    Hematuria [R31.9] 06/16/2017 Yes    Hydronephrosis [N13.30] 06/16/2017 Yes    Current use of anticoagulant therapy [Z79.01] 06/16/2017 Not Applicable    Malignant neoplasm of overlapping sites of bladder [C67.8] 06/16/2017 Yes    History of bladder cancer [Z85.51] 06/16/2017 Not Applicable    Coronary artery disease [I25.10] 06/15/2017 Yes    Essential hypertension [I10] 06/15/2017 Yes      Problems  Resolved During this Admission:    Diagnosis Date Noted Date Resolved POA    Hyperkalemia [E87.5] 06/15/2017 06/17/2017 Yes      No new Assessment & Plan notes have been filed under this hospital service since the last note was generated.  Service: Hospital Medicine      Discharged Condition: Terminal bladder CA, acute renal failure, urinary obstruction    Disposition: Hospice/Medical Facility    Follow Up:  Follow-up Information     [facility].    Specialty:  Hospice and Palliative Medicine  Why:  Hospice  Contact information:  [address] [place] [address] [id number] [phone]                 Patient Instructions:     Diet general     Activity as tolerated     Call MD for:   Scheduling Instructions: Follow up with HOSPICE MD       Medications:  Reconciled Home Medications:   Current Discharge Medication List      CONTINUE these medications which have NOT CHANGED    Details   atorvastatin (LIPITOR) 20 MG tablet Take 20 mg by mouth every evening.      cyanocobalamin (VITAMIN B-12) 1000 MCG tablet Take 100 mcg by mouth once daily.      levetiracetam (KEPPRA) 500 MG Tab Take 250 mg by mouth 2 (two) times daily.       mirabegron (MYRBETRIQ) 25 mg Tb24 ER tablet Take 25 mg by mouth once daily.      potassium bicarb-citric acid 10 mEq TbEF Take 0.5 tablets by mouth once daily.         STOP taking these medications       clopidogrel (PLAVIX) 75 mg tablet Comments:   Reason for Stopping:             Time spent on the discharge of patient: > 30 minutes    Joaquin Simeon MD  Department of Hospital Medicine  [facility]

## 2017-06-17 NOTE — PROGRESS NOTES
MANAV    Met with family who has medical POA and neighbor who has POA also  All agree that they want palliative care/ hospice  Bladder irrigated.  Still with active bleeding.  Vail removed.  She should be able to spontaneously pass clots into her diaper.  If she develops painful bladder distention, hospice can place a 24F regular 2 way vail and hand irrigate prn.  I would avoid CBI as this may cause bladder distention and exacerbate pain.  Spoke with Dr Simeon, her admitting physician, and , Malorie.  They will initiate the hospice process.  45 min face to face; more than 50% time spent counseling and coordinating care

## 2017-06-17 NOTE — PROGRESS NOTES
Progress Note  Hospital Medicine    Admit Date: 6/15/2017   LOS: 2 days     SUBJECTIVE:     Follow-up For:  Acute renal failure    Interval History:     Doing okay, no complaints.  No pain.  Bladder irrigation started yesterday.  BUN 92 > 60; creatinine 10.3 > 5.7. Repeat BMP pending.       Patient is a 87 y.o. female presents from nursing home with abnormal labs.  Patient in acute renal failure (unknown baseline) with , creatinine 14, potassium 6.5.  ED gave IVF and treatment for hyperkalemia and potassium now 5.4.       Patient has no complaints what so ever, and doesn't believe that something is wrong with her kidneys.  Powers catheter with bloody urine.      Patient somewhat poor historian, indicates doctors are out just for themselves.  Insists she doesn't believe anything is wrong with her kidneys.  States her POA is her friend Lottie Garcia.          DATE OF PROCEDURE:  06/16/2017   SURGEON:  Ger Yo M.D.   PROCEDURES:  1.  Cystoscopy with examination under anesthesia.  2.  Clot evacuation.  3.  Fulguration of bladder tumor, large.  4.  Attempted ureteral stent placement.  Neither ureteral orifice was not identifiable.  Then, 5 mg of Lasix was administered.  Approximately 45 minutes of observation were performed and we did not see efflux from either ureteral orifice. Bimanual examination was performed.  There was a mass palpable on the right side of the bladder.  Immediately following the surgery, I was able to speak with her urologist, Dr. Veto Rice.  She does indeed have a history of invasive high-grade bladder cancer and a year ago, she was deemed not able to tolerate curative therapy.  I spoke with Dr. Oliver Rosario, the nephrologist and I spoke with Interventional Radiology about nephrostomy tubes.  Dr. Roldan, the interventional radiologist and I spoke with the patient's neighbor who has power of .  Based on my   assessment, I do not think there is any reasonable chance of curing  her bladder cancer.  Given her age and comorbidities, I think comfort care measures are   clearly the most appropriate.         Review of Systems:  Constitutional: No fever or chills  Respiratory: No cough or shorness of breath  Cardiovascular: No chest pain or palpitations  Gastrointestinal: No nausea or vomiting  Neurological: No confusion or weakness    OBJECTIVE:     Vital Signs Range (Last 24H):  Vitals:    06/17/17 0520   BP: (!) 111/55   Pulse: 78   Resp: (!) 21   Temp:        Temp:  [97.5 °F (36.4 °C)-98.2 °F (36.8 °C)]   Pulse:  []   Resp:  [15-29]   BP: (111-160)/(55-90)   SpO2:  [100 %]     I & O (Last 24H):  Intake/Output Summary (Last 24 hours) at 06/17/17 0802  Last data filed at 06/17/17 0722   Gross per 24 hour   Intake            31699 ml   Output            12515 ml   Net             3090 ml       Physical Exam:  General appearance: Calm, NAD  Eyes:  Conjunctivae/corneas clear. PERRL.  Lungs: Normal respiratory effort,   clear to auscultation bilaterally   Heart: Regular rate and rhythm, S1, S2 normal,  Abdomen: Soft, non-tender non-distended; bowel sounds normal;   Extremities: No cyanosis or clubbing. No edema.    Skin: Skin color, texture, turgor normal. No rashes or lesions  Neurologic:  No focal numbness or weakness   - Powers with bloody urine output per irrigation    Laboratory Data:  Reviewed and noted  where applicable- Please see chart for full lab data.    Medications:  Medication list was reviewed and changes noted under Assessment/Plan.    Diagnostic Results:  CT HEAD WO 6/15/17:  IMPRESSION:  Multiple remote supratentorial and infratentorial infarcts with age-indeterminate left cerebellar infarct.    Right frontal scalp soft tissue thickening. No fracture or intracranial hemorrhage.    Cerebral volume loss and findings of chronic microvascular ischemic disease.        CXR 6/15/17:  IMPRESSION:  No acute cardiothoracic disease.        RENAL US 6/15/17:  Bilaterally the kidneys are  slightly small and contain multiple cysts. They measure 8.7 cm on the right and 9.1 cm on the left. Resistive indices are slightly elevated bilaterally, 0.70 on the right and 0.77 the left. On the right, there is severe hydronephrosis. There is mild hydronephrosis on the left.  The bladder is decompressed by Powers catheter.  BLADDER:  Unremarkable.  IMPRESSION:  Severe right and mild left hydronephrosis.        EKG: SR; no acute ST change      ASSESSMENT/PLAN:     Active Hospital Problems    Diagnosis  POA    *Acute renal failure [N17.9]  Yes    Anemia [D64.9]  Yes    Nonintractable epilepsy without status epilepticus [G40.909]  Yes    Metabolic acidosis, normal anion gap (NAG) [E87.2]  Yes    Hyperphosphatemia [E83.39]  Yes    Hematuria [R31.9]  Yes    Hydronephrosis [N13.30]  Yes    Current use of anticoagulant therapy [Z79.01]  Not Applicable    Malignant neoplasm of overlapping sites of bladder [C67.8]  Yes    History of bladder cancer [Z85.51]  Not Applicable    Hyperkalemia [E87.5]  Yes    Coronary artery disease [I25.10]  Yes    Essential hypertension [I10]  Yes      Resolved Hospital Problems    Diagnosis Date Resolved POA   No resolved problems to display.         Acute renal failure / Hyperkalemia / NAGMA  - Nephrology following  - Suspect due to obstruction  - BUN and creatinine decrease by half  - Bladder irrigation in progress; unclear if has some urine output  - Renal US to evaluate for any change    Hydronephrosis R > L;   - Urology following  - Recent Plavix not good candidate for nephrostomy tubes     Hematuria  - Bloody urine in Powers  - Urology following     H/o CAD / CVA  - On Statin, Plavix at home.  Held.      Epilepsy  - Keppra     Hyperphosphatemia  - From kidney failure  - Monitor     Social work for dispo  - POA Lottie Berrios   Laura Lema is the medical POA     SP SCD    Palliative care / hospice may be appropriate.  Plan to discuss further with team and  family.

## 2017-06-17 NOTE — NURSING
Pt discharged back to [facility] for f/up with [facility]. No distress noted, VSS, report called to nurse Ruggiero at [facility].

## 2017-06-17 NOTE — PROGRESS NOTES
Progress Note  [facility]    Admit Date: 6/15/2017   LOS: 2 days     SUBJECTIVE:     Follow-up For:      Interval History:     No new complaints    Review of Systems:  Constitutional: No fever or chills  Respiratory: No cough or shorness of breath  Cardiovascular: No chest pain or palpitations  Gastrointestinal: No nausea or vomiting  Neurological: No confusion or weakness    OBJECTIVE:     Vital Signs Range (Last 24H):  Vitals:    06/17/17 0824   BP:    Pulse: 108   Resp: (!) 23   Temp:        Temp:  [97.5 °F (36.4 °C)-98.4 °F (36.9 °C)]   Pulse:  []   Resp:  [15-29]   BP: (111-160)/(55-90)   SpO2:  [97 %-100 %]     I & O (Last 24H):  Intake/Output Summary (Last 24 hours) at 06/17/17 1012  Last data filed at 06/17/17 0945   Gross per 24 hour   Intake            83121 ml   Output            32213 ml   Net             3240 ml       Physical Exam:  General- Patient alert  in NAD  HEENT- PERRLA, EOMI, OP clear, MMM  Neck- No JVD, Lymphadenopathy, Thyromegaly  CV- Regular rate and rhythm, No Murmur/jitendra/rubs  Resp- Lungs CTA Bilaterally, No increased WOB  GI- Non tender/non-distended,   Extrem- No cyanosis, clubbing, edema.   Derm- No rashes, masses, or lesions noted  Laboratory Data:  Reviewed and noted  where applicable- Please see chart for full lab data.    Medications:  Medication list was reviewed and changes noted under Assessment/Plan.    Diagnostic Results:        ASSESSMENT/PLAN:     Active Hospital Problems    Diagnosis  POA    *Acute renal failure [N17.9]  Yes    Anemia [D64.9]  Yes    Nonintractable epilepsy without status epilepticus [G40.909]  Yes    Metabolic acidosis, normal anion gap (NAG) [E87.2]  Yes    Hyperphosphatemia [E83.39]  Yes    Hematuria [R31.9]  Yes    Hydronephrosis [N13.30]  Yes    Current use of anticoagulant therapy [Z79.01]  Not Applicable    Malignant neoplasm of overlapping sites of bladder [C67.8]  Yes    History of bladder cancer [Z85.51]  Not Applicable     Hyperkalemia [E87.5]  Yes    Coronary artery disease [I25.10]  Yes    Essential hypertension [I10]  Yes      Resolved Hospital Problems    Diagnosis Date Resolved POA   No resolved problems to display.     WANDER on unknown baseline renal function  -US kidneys with right hydro. Urology consulted s/p failed ureteral stent placement due to invasive high-grade bladder cancer. Nephrosomies an option but to what end?    -nephrotic range proteinuria present, work up sent prior to above DX. No need to follow up.  -cr improving with hydration and removal of bladder clots.no need for emergernt HD at this time.  Rec: continue IV hydration. Hospice eval should be strongly considered.     Hyperkalemia  -tx with fluids and metolazone 10mg po x 1  -recheck in AM  -may be related to obstructive uropathy.     Metabolic acidosis with mild GAP  -likely due to WANDER. No need to treat at this time.     Secondary hyperparathyroidism  -hold tx for now.     Anemia  -TSH/iron wnl. No need for transfusion.

## 2017-06-17 NOTE — PLAN OF CARE
Problem: Patient Care Overview  Goal: Plan of Care Review  Outcome: Ongoing (interventions implemented as appropriate)  Patient in no apparent distress on RA sat's 100% O2 not needed.

## 2017-06-17 NOTE — PLAN OF CARE
Problem: Patient Care Overview  Goal: Plan of Care Review  Outcome: Ongoing (interventions implemented as appropriate)  No changes at this time.  Will continue to monitor

## 2017-06-17 NOTE — PLAN OF CARE
[facility]   Department of Hospital Medicine  [address] [place] [id number]  [phone] (phone)  [phone] (fax)      Facility Transfer Orders                        06/17/2017    Yamini Ellis    Admit to:   Hospice    Diagnoses:  Active Hospital Problems    Diagnosis  POA    *Acute renal failure [N17.9]  Yes    Anemia [D64.9]  Yes    Nonintractable epilepsy without status epilepticus [G40.909]  Yes    Metabolic acidosis, normal anion gap (NAG) [E87.2]  Yes    Hyperphosphatemia [E83.39]  Yes    Hematuria [R31.9]  Yes    Hydronephrosis [N13.30]  Yes    Current use of anticoagulant therapy [Z79.01]  Not Applicable    Malignant neoplasm of overlapping sites of bladder [C67.8]  Yes    History of bladder cancer [Z85.51]  Not Applicable    Coronary artery disease [I25.10]  Yes    Essential hypertension [I10]  Yes      Resolved Hospital Problems    Diagnosis Date Resolved POA    Hyperkalemia [E87.5] 06/17/2017 Yes       Allergies:  Review of patient's allergies indicates:   Allergen Reactions    Lactose        Vitals:      Routine, per facility    Diet: Regular    Activity: As tolerated    Nursing Precautions:    - Aspiration precautions standard per facility    - Fall precautions   - Seizure precaution   - Decubitus precautions:    LABS:  Any per facility protocol    Medications: Discontinue all previous medication orders, if any. See new list below.      Current Discharge Medication List      CONTINUE these medications which have NOT CHANGED    Details   atorvastatin (LIPITOR) 20 MG tablet Take 20 mg by mouth every evening.      cyanocobalamin (VITAMIN B-12) 1000 MCG tablet Take 100 mcg by mouth once daily.      levetiracetam (KEPPRA) 500 MG Tab Take 250 mg by mouth 2 (two) times daily.       mirabegron (MYRBETRIQ) 25 mg Tb24 ER tablet Take 25 mg by mouth once daily.      potassium bicarb-citric acid 10 mEq TbEF Take 0.5 tablets by mouth once daily.          STOP taking these medications       clopidogrel (PLAVIX) 75 mg tablet Comments:   Reason for Stopping:              atorvastatin  20 mg Oral QHS    cefTRIAXone (ROCEPHIN) IVPB  1 g Intravenous Q24H    famotidine (PF)  20 mg Intravenous Q12H    levetiracetam  250 mg Oral BID    metOLazone  5 mg Oral Once    sodium chloride 0.9%  3 mL Intravenous Q8H               _________________________________  Joaquin Simeon MD  06/17/2017

## 2017-06-17 NOTE — PLAN OF CARE
Discharge planner met with family members in family cynthia Re: hospice consultation.  Patient's Lottie GRANADOS has agreed to move forward with home w/ hospice care at NH.  Lottie preferred to have NH preferred hospice agency. Called [facility] at [phone], awaiting call back from Debora. AUGIE Ferrell     1430- Received call back from Deandra (on-call ), requesting clinical information via fax, faxed to [phone].  Awaiting call back from Debora with notification of liaison visitation at nursing home facility. AUGIE Ferrell      Faxed discharge orders and avs to [facility] at [phone]. Fax confirmation received. Updated patient nurse, Melody thurman to call patient report to [facility] at [phone]. AUGIE Ferrell      Called [facility] at [phone], arranged for patient transport back to facility room 11B. ETA 1630. Updated  Patient nurse, Melody. AUGIE Ferrell       Discharge planner called Lottie GRANADOS, confirmed [facility] consultation arranged for tomorrow at  facility. AUGIE Ferrell

## 2017-06-19 LAB
ANA SER QL IF: POSITIVE
ANA TITR SER IF: NORMAL {TITER}
ANCA AB TITR SER IF: NORMAL TITER
INTERPRETATION SERPL IFE-IMP: NORMAL
KAPPA LC SER QL IA: 16.03 MG/DL
KAPPA LC/LAMBDA SER IA: 2.72
LAMBDA LC SER QL IA: 5.89 MG/DL
P-ANCA TITR SER IF: NORMAL TITER
PATHOLOGIST INTERPRETATION IFE: NORMAL
PHOSPHOLIPASE A2 RECEPTOR, ELISA: <2 RU/ML
PHOSPHOLIPASE A2 RECEPTOR, IFA: NEGATIVE

## 2017-06-20 LAB
BLD PROD TYP BPU: NORMAL
BLD PROD TYP BPU: NORMAL
BLOOD UNIT EXPIRATION DATE: NORMAL
BLOOD UNIT EXPIRATION DATE: NORMAL
BLOOD UNIT TYPE CODE: 6200
BLOOD UNIT TYPE CODE: 6200
BLOOD UNIT TYPE: NORMAL
BLOOD UNIT TYPE: NORMAL
CODING SYSTEM: NORMAL
CODING SYSTEM: NORMAL
DISPENSE STATUS: NORMAL
DISPENSE STATUS: NORMAL
TRANS ERYTHROCYTES VOL PATIENT: NORMAL ML
TRANS ERYTHROCYTES VOL PATIENT: NORMAL ML

## 2017-06-20 NOTE — PLAN OF CARE
Discharged to NH for Hospice.     06/20/17 1251   Final Note   Assessment Type Final Discharge Note   Discharge Disposition [facility]

## 2017-06-20 NOTE — PLAN OF CARE
Discharged 6/17 to NH     06/20/17 1251   Final Note   Assessment Type Final Discharge Note   Discharge Disposition Nurse

## 2017-06-21 LAB
ANTI SM ANTIBODY: 0.84 EU
ANTI SM/RNP ANTIBODY: 3.08 EU
ANTI-SM INTERPRETATION: NEGATIVE
ANTI-SM/RNP INTERPRETATION: NEGATIVE
ANTI-SSA ANTIBODY: 3.76 EU
ANTI-SSA INTERPRETATION: NEGATIVE
ANTI-SSB ANTIBODY: 0.73 EU
ANTI-SSB INTERPRETATION: NEGATIVE
DSDNA AB SER-ACNC: NORMAL [IU]/ML

## 2017-06-29 ENCOUNTER — HOSPITAL ENCOUNTER (EMERGENCY)
Facility: OTHER | Age: 82
Discharge: HOME | End: 2017-06-29
Attending: EMERGENCY MEDICINE
Payer: MEDICARE

## 2017-06-29 VITALS
RESPIRATION RATE: 17 BRPM | DIASTOLIC BLOOD PRESSURE: 66 MMHG | OXYGEN SATURATION: 98 % | HEART RATE: 84 BPM | HEIGHT: 63 IN | WEIGHT: 148 LBS | SYSTOLIC BLOOD PRESSURE: 129 MMHG | BODY MASS INDEX: 26.22 KG/M2 | TEMPERATURE: 99 F

## 2017-06-29 DIAGNOSIS — D64.9 ANEMIA, UNSPECIFIED TYPE: Primary | ICD-10-CM

## 2017-06-29 DIAGNOSIS — C67.9 MALIGNANT NEOPLASM OF URINARY BLADDER, UNSPECIFIED SITE: ICD-10-CM

## 2017-06-29 LAB
ABO + RH BLD: NORMAL
ALBUMIN SERPL BCP-MCNC: 3.2 G/DL
ALP SERPL-CCNC: 65 U/L
ALT SERPL W/O P-5'-P-CCNC: 11 U/L
ANION GAP SERPL CALC-SCNC: 12 MMOL/L
APTT BLDCRRT: 26.8 SEC
AST SERPL-CCNC: 15 U/L
BACTERIA #/AREA URNS HPF: ABNORMAL /HPF
BASOPHILS # BLD AUTO: 0.01 K/UL
BASOPHILS NFR BLD: 0.1 %
BILIRUB SERPL-MCNC: 0.3 MG/DL
BILIRUB UR QL STRIP: NEGATIVE
BLD GP AB SCN CELLS X3 SERPL QL: NORMAL
BUN SERPL-MCNC: 53 MG/DL
CALCIUM SERPL-MCNC: 9 MG/DL
CHLORIDE SERPL-SCNC: 100 MMOL/L
CLARITY UR: ABNORMAL
CO2 SERPL-SCNC: 25 MMOL/L
COLOR UR: YELLOW
CREAT SERPL-MCNC: 3.7 MG/DL
DIFFERENTIAL METHOD: ABNORMAL
EOSINOPHIL # BLD AUTO: 0.2 K/UL
EOSINOPHIL NFR BLD: 3.1 %
ERYTHROCYTE [DISTWIDTH] IN BLOOD BY AUTOMATED COUNT: 12.9 %
EST. GFR  (AFRICAN AMERICAN): 12 ML/MIN/1.73 M^2
EST. GFR  (NON AFRICAN AMERICAN): 10 ML/MIN/1.73 M^2
GLUCOSE SERPL-MCNC: 84 MG/DL
GLUCOSE UR QL STRIP: NEGATIVE
HCT VFR BLD AUTO: 20.9 %
HGB BLD-MCNC: 7.1 G/DL
HGB UR QL STRIP: ABNORMAL
INR PPP: 0.9
KETONES UR QL STRIP: NEGATIVE
LEUKOCYTE ESTERASE UR QL STRIP: ABNORMAL
LYMPHOCYTES # BLD AUTO: 1.4 K/UL
LYMPHOCYTES NFR BLD: 21 %
MCH RBC QN AUTO: 30.3 PG
MCHC RBC AUTO-ENTMCNC: 34 %
MCV RBC AUTO: 89 FL
MICROSCOPIC COMMENT: ABNORMAL
MONOCYTES # BLD AUTO: 0.9 K/UL
MONOCYTES NFR BLD: 12.8 %
NEUTROPHILS # BLD AUTO: 4.3 K/UL
NEUTROPHILS NFR BLD: 62.6 %
NITRITE UR QL STRIP: NEGATIVE
PH UR STRIP: 6 [PH] (ref 5–8)
PLATELET # BLD AUTO: 229 K/UL
PMV BLD AUTO: 8.6 FL
POTASSIUM SERPL-SCNC: 4.1 MMOL/L
PROT SERPL-MCNC: 7.9 G/DL
PROT UR QL STRIP: ABNORMAL
PROTHROMBIN TIME: 10.1 SEC
RBC # BLD AUTO: 2.34 M/UL
RBC #/AREA URNS HPF: 35 /HPF (ref 0–4)
SODIUM SERPL-SCNC: 137 MMOL/L
SP GR UR STRIP: <=1.005 (ref 1–1.03)
SQUAMOUS #/AREA URNS HPF: 14 /HPF
URN SPEC COLLECT METH UR: ABNORMAL
UROBILINOGEN UR STRIP-ACNC: NEGATIVE EU/DL
WBC # BLD AUTO: 6.87 K/UL
WBC #/AREA URNS HPF: >100 /HPF (ref 0–5)

## 2017-06-29 PROCEDURE — 99285 EMERGENCY DEPT VISIT HI MDM: CPT | Mod: 25

## 2017-06-29 PROCEDURE — 93005 ELECTROCARDIOGRAM TRACING: CPT

## 2017-06-29 PROCEDURE — P9612 CATHETERIZE FOR URINE SPEC: HCPCS

## 2017-06-29 PROCEDURE — 85610 PROTHROMBIN TIME: CPT

## 2017-06-29 PROCEDURE — 85025 COMPLETE CBC W/AUTO DIFF WBC: CPT

## 2017-06-29 PROCEDURE — 80053 COMPREHEN METABOLIC PANEL: CPT

## 2017-06-29 PROCEDURE — 86900 BLOOD TYPING SEROLOGIC ABO: CPT

## 2017-06-29 PROCEDURE — 85730 THROMBOPLASTIN TIME PARTIAL: CPT

## 2017-06-29 PROCEDURE — 81000 URINALYSIS NONAUTO W/SCOPE: CPT

## 2017-06-29 PROCEDURE — 93010 ELECTROCARDIOGRAM REPORT: CPT | Mod: ,,, | Performed by: INTERNAL MEDICINE

## 2017-06-29 PROCEDURE — 86901 BLOOD TYPING SEROLOGIC RH(D): CPT

## 2017-06-29 PROCEDURE — 36415 COLL VENOUS BLD VENIPUNCTURE: CPT

## 2017-06-29 RX ORDER — LEVETIRACETAM 250 MG/1
250 TABLET ORAL 2 TIMES DAILY
COMMUNITY

## 2017-06-29 RX ORDER — POTASSIUM CHLORIDE 750 MG/1
5 TABLET, EXTENDED RELEASE ORAL DAILY
Status: ON HOLD | COMMUNITY
End: 2017-08-20 | Stop reason: HOSPADM

## 2017-06-29 RX ORDER — CLOPIDOGREL BISULFATE 75 MG/1
75 TABLET ORAL DAILY
Status: ON HOLD | COMMUNITY
End: 2017-08-20 | Stop reason: HOSPADM

## 2017-06-29 NOTE — ED PROVIDER NOTES
"Encounter Date: 6/29/2017    SCRIBE #1 NOTE: I, Jai Moctezuma, am scribing for, and in the presence of,  Dr. Noe. I have scribed the entire note.       History     Chief Complaint   Patient presents with    Abnormal Lab     Patients living facility performed blood work on her and her H&H was 6.9 & 19.9.  Patient brought in for possible transfusion.  Patient recently here for same thing.      Time seen by provider: 4:38 PM    This is a 87 y.o. female with history of inoperable bladder cancer and anemia who presents with complaint of abnormal lab. She had blood work done this morning which showed an H&H of 6.9 and 19.9. Per her friend Lottie (also her POA), she has been experiencing hematuria for 2 weeks since she was last seen and discharged from here. The patient states that she is "feeling fine" and denies any complaints including fever, pain, nausea, vomiting, diarrhea, SOB and CP. She is a poor historian.       The history is provided by the patient and a relative.     Review of patient's allergies indicates:   Allergen Reactions    Lactose      Past Medical History:   Diagnosis Date    Anemia of decreased vitamin B12 absorption     Aphasia     Coronary atherosclerosis of unspecified type of vessel, native or graft     Epilepsy     Hemiparesis affecting right side as late effect of cerebrovascular accident     Hypertension     Hypokalemia     Hypothyroidism     Other and unspecified hyperlipidemia     Stroke     Transient ischemic attack (TIA), and cerebral infarction without residual deficits     Unspecified hypothyroidism      History reviewed. No pertinent surgical history.  History reviewed. No pertinent family history.  Social History   Substance Use Topics    Smoking status: Former Smoker    Smokeless tobacco: Not on file    Alcohol use No     Review of Systems   Constitutional: Negative for chills, diaphoresis and fever.        Low H&H   HENT: Negative for sore throat.    Eyes: " Negative for pain.   Respiratory: Negative for shortness of breath.    Cardiovascular: Negative for chest pain.   Gastrointestinal: Negative for diarrhea, nausea and vomiting.   Genitourinary: Positive for hematuria. Negative for decreased urine volume and dysuria.   Musculoskeletal: Negative for back pain and myalgias.   Skin: Negative for color change.   Neurological: Negative for headaches.   Psychiatric/Behavioral: Negative for behavioral problems and confusion.       Physical Exam     Initial Vitals [06/29/17 1558]   BP Pulse Resp Temp SpO2   (!) 140/62 76 14 98.5 °F (36.9 °C) 95 %      MAP       88         Physical Exam    Nursing note and vitals reviewed.  Constitutional: She appears well-developed and well-nourished. She is not diaphoretic. No distress.   HENT:   Head: Normocephalic and atraumatic.   Mouth/Throat: Oropharynx is clear and moist.   Eyes: Conjunctivae are normal. Pupils are equal, round, and reactive to light. Right eye exhibits no discharge. Left eye exhibits no discharge.   Neck: Normal range of motion. Neck supple.   Cardiovascular: Normal rate, regular rhythm, normal heart sounds and intact distal pulses. Exam reveals no gallop and no friction rub.    No murmur heard.  Pulmonary/Chest: Breath sounds normal. No respiratory distress. She has no wheezes. She has no rhonchi. She has no rales.   Poor respiratory effort. Lung sounds are clear.    Abdominal: Soft. Bowel sounds are normal. She exhibits no distension. There is no tenderness. There is no rebound and no guarding.   Musculoskeletal: Normal range of motion. She exhibits no edema or tenderness.   Kyphosis.    Neurological: She is alert and oriented to person, place, and time. No sensory deficit.   Contractures of bilateral upper extremities.   Skin: Skin is warm and dry. No rash and no abscess noted. No erythema. No pallor.   Right upper thoracic mass.    Psychiatric: She has a normal mood and affect. Thought content normal.         ED  Course   Procedures  Labs Reviewed   CBC W/ AUTO DIFFERENTIAL - Abnormal; Notable for the following:        Result Value    RBC 2.34 (*)     Hemoglobin 7.1 (*)     Hematocrit 20.9 (*)     MPV 8.6 (*)     All other components within normal limits   URINALYSIS - Abnormal; Notable for the following:     Appearance, UA Hazy (*)     Specific Gravity, UA <=1.005 (*)     Protein, UA Trace (*)     Occult Blood UA 3+ (*)     Leukocytes, UA 3+ (*)     All other components within normal limits   COMPREHENSIVE METABOLIC PANEL - Abnormal; Notable for the following:     BUN, Bld 53 (*)     Creatinine 3.7 (*)     Albumin 3.2 (*)     eGFR if  12 (*)     eGFR if non  10 (*)     All other components within normal limits   URINALYSIS MICROSCOPIC - Abnormal; Notable for the following:     RBC, UA 35 (*)     WBC, UA >100 (*)     Bacteria, UA Few (*)     All other components within normal limits   PROTIME-INR   APTT   COMPREHENSIVE METABOLIC PANEL   TYPE & SCREEN     EKG Readings: (Independently Interpreted)   Initial Reading: No STEMI.   NSR rate of 93. No STEMI. Motion artifact present.           Medical Decision Making:   Clinical Tests:   Lab Tests: Ordered and Reviewed  Medical Tests: Ordered and Reviewed  ED Management:  6:26 PM - I paged Dr. Simeon ([facility]).  6:41 PM - I discussed the case with Dr. Simeon.     Emergent evaluation of 87-year-old female with complaint of abnormal lab, low H&H.  Vital signs are benign, afebrile.  Patient is asymptomatic.  Patient has a known inoperable bladder tumor, with chronic hematuria.  She does not desire blood transfusion.  Labs performed here to show anemia, but patient declined transfusion.  We'll treat with oral Fergon.  Other labs are benign, no major electrolyte disturbance, chronic renal failure.  Urinalysis shows many RBCs and many WBCs.  EKG did not show any ischemic change.  She was discharged in good condition and encouraged to return if she  changes her mind or for worsening symptoms.  I also discussed the case with Dr. Simeon, who was unaware that the patient had changed her mind about hospice care.  She was discharged back to her nursing home in stable condition.  On exam I did note and upper thoracic mass, I suspect this is a lipoma based on its appearance.    Additional MDM:   EKG: I have independently interpreted EKG(s) - see notes.          Scribe Attestation:   Scribe #1: I performed the above scribed service and the documentation accurately describes the services I performed. I attest to the accuracy of the note.    Attending Attestation:           Physician Attestation for Scribe:  Physician Attestation Statement for Scribe #1: I, Dr. Noe, reviewed documentation, as scribed by Jai Moctezuma in my presence, and it is both accurate and complete.                 ED Course     Clinical Impression:     1. Anemia, unspecified type    2. Malignant neoplasm of urinary bladder, unspecified site                                Margie Noe MD  06/29/17 2047       Margie Noe MD  06/29/17 2051

## 2017-06-30 NOTE — ED NOTES
Bedside report received from RUTH Myers. Pt resting comfortably on stretcher, respirations even and unlabored, pt in no acute distress. Will continue to monitor.

## 2017-08-20 ENCOUNTER — HOSPITAL ENCOUNTER (OUTPATIENT)
Facility: OTHER | Age: 82
End: 2017-08-20
Attending: EMERGENCY MEDICINE | Admitting: HOSPITALIST
Payer: MEDICARE

## 2017-08-20 VITALS
DIASTOLIC BLOOD PRESSURE: 61 MMHG | HEIGHT: 65 IN | BODY MASS INDEX: 22.1 KG/M2 | SYSTOLIC BLOOD PRESSURE: 141 MMHG | TEMPERATURE: 99 F | HEART RATE: 83 BPM | WEIGHT: 132.63 LBS | OXYGEN SATURATION: 96 % | RESPIRATION RATE: 20 BRPM

## 2017-08-20 DIAGNOSIS — R19.5 OCCULT BLOOD POSITIVE STOOL: ICD-10-CM

## 2017-08-20 DIAGNOSIS — K92.0 HEMATEMESIS: Primary | ICD-10-CM

## 2017-08-20 DIAGNOSIS — D64.9 CHRONIC ANEMIA: ICD-10-CM

## 2017-08-20 DIAGNOSIS — N18.4 CKD (CHRONIC KIDNEY DISEASE), STAGE IV: ICD-10-CM

## 2017-08-20 DIAGNOSIS — I10 ESSENTIAL HYPERTENSION: ICD-10-CM

## 2017-08-20 DIAGNOSIS — C67.8 MALIGNANT NEOPLASM OF OVERLAPPING SITES OF BLADDER: ICD-10-CM

## 2017-08-20 LAB
ABO + RH BLD: NORMAL
ALBUMIN SERPL BCP-MCNC: 2.8 G/DL
ALP SERPL-CCNC: 57 U/L
ALT SERPL W/O P-5'-P-CCNC: 20 U/L
ANION GAP SERPL CALC-SCNC: 7 MMOL/L
APTT BLDCRRT: 28 SEC
AST SERPL-CCNC: 26 U/L
BASOPHILS # BLD AUTO: 0.01 K/UL
BASOPHILS NFR BLD: 0.1 %
BILIRUB SERPL-MCNC: 0.3 MG/DL
BLD GP AB SCN CELLS X3 SERPL QL: NORMAL
BLD PROD TYP BPU: NORMAL
BLOOD UNIT EXPIRATION DATE: NORMAL
BLOOD UNIT TYPE CODE: 9500
BLOOD UNIT TYPE: NORMAL
BUN SERPL-MCNC: 26 MG/DL
CALCIUM SERPL-MCNC: 8.5 MG/DL
CHLORIDE SERPL-SCNC: 108 MMOL/L
CO2 SERPL-SCNC: 24 MMOL/L
CODING SYSTEM: NORMAL
CREAT SERPL-MCNC: 2.5 MG/DL
DIFFERENTIAL METHOD: ABNORMAL
DISPENSE STATUS: NORMAL
EOSINOPHIL # BLD AUTO: 0.2 K/UL
EOSINOPHIL NFR BLD: 2.3 %
ERYTHROCYTE [DISTWIDTH] IN BLOOD BY AUTOMATED COUNT: 14.3 %
EST. GFR  (AFRICAN AMERICAN): 19 ML/MIN/1.73 M^2
EST. GFR  (NON AFRICAN AMERICAN): 17 ML/MIN/1.73 M^2
GLUCOSE SERPL-MCNC: 96 MG/DL
HCT VFR BLD AUTO: 21 %
HGB BLD-MCNC: 7.3 G/DL
INR PPP: 0.9
LYMPHOCYTES # BLD AUTO: 1.4 K/UL
LYMPHOCYTES NFR BLD: 17.1 %
MCH RBC QN AUTO: 32.2 PG
MCHC RBC AUTO-ENTMCNC: 34.8 G/DL
MCV RBC AUTO: 93 FL
MONOCYTES # BLD AUTO: 0.6 K/UL
MONOCYTES NFR BLD: 7.3 %
NEUTROPHILS # BLD AUTO: 6.1 K/UL
NEUTROPHILS NFR BLD: 72.8 %
NUM UNITS TRANS PACKED RBC: NORMAL
PLATELET # BLD AUTO: 182 K/UL
PMV BLD AUTO: 8.6 FL
POTASSIUM SERPL-SCNC: 4.2 MMOL/L
PROT SERPL-MCNC: 6.9 G/DL
PROTHROMBIN TIME: 10 SEC
RBC # BLD AUTO: 2.27 M/UL
SODIUM SERPL-SCNC: 139 MMOL/L
WBC # BLD AUTO: 8.41 K/UL

## 2017-08-20 PROCEDURE — 25000003 PHARM REV CODE 250: Performed by: FAMILY MEDICINE

## 2017-08-20 PROCEDURE — 86900 BLOOD TYPING SEROLOGIC ABO: CPT

## 2017-08-20 PROCEDURE — 80053 COMPREHEN METABOLIC PANEL: CPT

## 2017-08-20 PROCEDURE — G0378 HOSPITAL OBSERVATION PER HR: HCPCS

## 2017-08-20 PROCEDURE — 96374 THER/PROPH/DIAG INJ IV PUSH: CPT

## 2017-08-20 PROCEDURE — 99284 EMERGENCY DEPT VISIT MOD MDM: CPT | Mod: 25

## 2017-08-20 PROCEDURE — C9113 INJ PANTOPRAZOLE SODIUM, VIA: HCPCS | Performed by: EMERGENCY MEDICINE

## 2017-08-20 PROCEDURE — 85610 PROTHROMBIN TIME: CPT

## 2017-08-20 PROCEDURE — 85025 COMPLETE CBC W/AUTO DIFF WBC: CPT

## 2017-08-20 PROCEDURE — 86901 BLOOD TYPING SEROLOGIC RH(D): CPT

## 2017-08-20 PROCEDURE — 36430 TRANSFUSION BLD/BLD COMPNT: CPT

## 2017-08-20 PROCEDURE — 86920 COMPATIBILITY TEST SPIN: CPT

## 2017-08-20 PROCEDURE — 99220 PR INITIAL OBSERVATION CARE,LEVL III: CPT | Mod: ,,, | Performed by: FAMILY MEDICINE

## 2017-08-20 PROCEDURE — P9040 RBC LEUKOREDUCED IRRADIATED: HCPCS

## 2017-08-20 PROCEDURE — 85730 THROMBOPLASTIN TIME PARTIAL: CPT

## 2017-08-20 PROCEDURE — 63600175 PHARM REV CODE 636 W HCPCS: Performed by: EMERGENCY MEDICINE

## 2017-08-20 RX ORDER — PANTOPRAZOLE SODIUM 40 MG/10ML
40 INJECTION, POWDER, LYOPHILIZED, FOR SOLUTION INTRAVENOUS
Status: COMPLETED | OUTPATIENT
Start: 2017-08-20 | End: 2017-08-20

## 2017-08-20 RX ORDER — ATORVASTATIN CALCIUM 20 MG/1
20 TABLET, FILM COATED ORAL NIGHTLY
Status: DISCONTINUED | OUTPATIENT
Start: 2017-08-20 | End: 2017-08-20 | Stop reason: HOSPADM

## 2017-08-20 RX ORDER — HYDROCODONE BITARTRATE AND ACETAMINOPHEN 500; 5 MG/1; MG/1
TABLET ORAL
Status: DISCONTINUED | OUTPATIENT
Start: 2017-08-20 | End: 2017-08-20 | Stop reason: HOSPADM

## 2017-08-20 RX ORDER — LEVETIRACETAM 250 MG/1
250 TABLET ORAL 2 TIMES DAILY
Status: DISCONTINUED | OUTPATIENT
Start: 2017-08-20 | End: 2017-08-20 | Stop reason: HOSPADM

## 2017-08-20 RX ADMIN — LEVETIRACETAM 250 MG: 250 TABLET, FILM COATED ORAL at 09:08

## 2017-08-20 RX ADMIN — PANTOPRAZOLE SODIUM 40 MG: 40 INJECTION, POWDER, FOR SOLUTION INTRAVENOUS at 01:08

## 2017-08-20 NOTE — PLAN OF CARE
DC Dispo: Patient to return to [facility] via [facility] (see previous note). All questions answered, CM to remain supportive.     08/20/17 1426   Final Note   Assessment Type Final Discharge Note   Discharge Disposition Nurse   Discharge planning education complete? Yes   Discharge plans and expectations educations in teach back method with documentation complete? Yes   Offered [facility] -- Bedside Delivery? n/a   Discharge/Hospital Encounter Summary to ([facility]) PCP n/a   Referral to Outpatient Case Management complete? n/a   Referral to / orders for Home Health Complete? n/a   30 day supply of medicines given at discharge, if documented non-compliance / non-adherence? n/a   Any social issues identified prior to discharge? Yes   Did you assess the readiness or willingness of the family or caregiver to support self management of care? Yes

## 2017-08-20 NOTE — ED NOTES
Pt resting comfortably in bed with no signs of distress. Pt denies and nausea at the time. Pt has no other needs at the moment. Will continue to monitor.

## 2017-08-20 NOTE — H&P
[facility] Hospital Medicine  History & Physical    Patient Name: Yamini Ellis  MRN: [id number]  Admission Date: 8/20/2017  Attending Physician: Dr. John  Primary Care Provider: Primary Doctor No         Patient information was obtained from patient and ER records.     Subjective:     Principal Problem:Hematemesis    Chief Complaint:   Chief Complaint   Patient presents with    Hematemesis     coffe ground emesis,  per [facility] X 1 at about 10:30 tonight        HPI: 87 yr old pleasant female with epilepsy, CAD, CKD IV, HTN, CVA, hypothyroidism, chronic anemia, and other co morbidities presents to emergency complaining coffee ground emesis and rectal bleed. She is brought from nursing home. She denies any shortness of breath, fatigue, chest pain, palpitation or vision abnormality. On presentation, she was anemic with Hemoglobin 7.3, rectal bleed, and pallor. She will be admitted for transfusion and GI consult.    Past Medical History:   Diagnosis Date    Anemia of decreased vitamin B12 absorption     Aphasia     Coronary atherosclerosis of unspecified type of vessel, native or graft     Epilepsy     Hemiparesis affecting right side as late effect of cerebrovascular accident     Hypertension     Hypokalemia     Hypothyroidism     Other and unspecified hyperlipidemia     Stroke     Transient ischemic attack (TIA), and cerebral infarction without residual deficits     Unspecified hypothyroidism        History reviewed. No pertinent surgical history.    Review of patient's allergies indicates:   Allergen Reactions    Lactose        No current facility-administered medications on file prior to encounter.      Current Outpatient Prescriptions on File Prior to Encounter   Medication Sig    atorvastatin (LIPITOR) 20 MG tablet Take 20 mg by mouth every evening.    cyanocobalamin (VITAMIN B-12) 1000 MCG tablet Take 100 mcg by mouth once daily.    ferrous gluconate (FERGON) 325 MG  Tab Take 1 tablet (325 mg total) by mouth 2 (two) times daily with meals.    levetiracetam (KEPPRA) 250 MG Tab Take 250 mg by mouth 2 (two) times daily.    mirabegron (MYRBETRIQ) 25 mg Tb24 ER tablet Take 25 mg by mouth once daily.    potassium chloride (KLOR-CON) 10 MEQ TbSR Take 5 mEq by mouth once daily.     clopidogrel (PLAVIX) 75 mg tablet Take 75 mg by mouth once daily.     Family History     None        Social History Main Topics    Smoking status: Former Smoker    Smokeless tobacco: Never Used    Alcohol use No    Drug use: No    Sexual activity: Not on file     Review of Systems   Constitutional: Positive for fatigue. Negative for activity change, chills and unexpected weight change.   HENT: Negative.  Negative for congestion, drooling, ear pain, hearing loss, nosebleeds, rhinorrhea, sneezing, tinnitus and voice change.    Respiratory: Negative.  Negative for apnea, choking, shortness of breath and wheezing.    Cardiovascular: Negative.  Negative for chest pain and palpitations.   Gastrointestinal: Positive for blood in stool. Negative for abdominal distention, abdominal pain, diarrhea and rectal pain.        Coffee ground emesis   Genitourinary: Negative.  Negative for difficulty urinating, dysuria, flank pain, genital sores, menstrual problem, vaginal bleeding and vaginal pain.   Musculoskeletal: Negative for arthralgias, joint swelling and neck pain.   Skin: Negative.  Negative for color change, pallor and wound.   Neurological: Negative for dizziness, seizures, syncope, facial asymmetry, weakness and light-headedness.   Hematological: Negative.  Negative for adenopathy. Does not bruise/bleed easily.   Psychiatric/Behavioral: Negative.  Negative for agitation, confusion, dysphoric mood and suicidal ideas. The patient is not hyperactive.      Objective:     Vital Signs (Most Recent):  Temp: 98.1 °F (36.7 °C) (08/20/17 0428)  Pulse: 75 (08/20/17 0428)  Resp: 20 (08/20/17 0428)  BP: 124/86  (08/20/17 0428)  SpO2: 98 % (08/20/17 0428) Vital Signs (24h Range):  Temp:  [97.5 °F (36.4 °C)-98.1 °F (36.7 °C)] 98.1 °F (36.7 °C)  Pulse:  [63-78] 75  Resp:  [20] 20  SpO2:  [98 %-99 %] 98 %  BP: (124-131)/(58-86) 124/86     Weight: 72.6 kg (160 lb)  Body mass index is 26.63 kg/m².    Physical Exam   Constitutional: She is oriented to person, place, and time. She appears well-developed and well-nourished. No distress.   HENT:   Head: Normocephalic and atraumatic.   Eyes: EOM are normal. Pupils are equal, round, and reactive to light.   Neck: Normal range of motion. Neck supple.   Cardiovascular: Normal rate, regular rhythm and intact distal pulses.  Exam reveals no gallop and no friction rub.    Murmur (2/6 ESm best heard in aortic area) heard.  Pulmonary/Chest: Effort normal and breath sounds normal. No respiratory distress. She has no wheezes. She has no rales. She exhibits no tenderness.   Abdominal: Soft. Bowel sounds are normal. She exhibits no distension and no mass. There is no tenderness. There is no rebound and no guarding. No hernia.   FOBT positive stool   Neurological: She is alert and oriented to person, place, and time. She has normal reflexes. She displays normal reflexes. No cranial nerve deficit. She exhibits normal muscle tone.   Skin: Skin is warm and dry. Capillary refill takes less than 2 seconds. She is not diaphoretic. There is pallor.   Psychiatric: She has a normal mood and affect. Her behavior is normal. Judgment and thought content normal.       Significant Labs:     Admission on 08/20/2017   Component Date Value Ref Range Status    WBC 08/20/2017 8.41  3.90 - 12.70 K/uL Final    RBC 08/20/2017 2.27* 4.00 - 5.40 M/uL Final    Hemoglobin 08/20/2017 7.3* 12.0 - 16.0 g/dL Final    Hematocrit 08/20/2017 21.0* 37.0 - 48.5 % Final    MCV 08/20/2017 93  82 - 98 fL Final    MCH 08/20/2017 32.2* 27.0 - 31.0 pg Final    MCHC 08/20/2017 34.8  32.0 - 36.0 g/dL Final    RDW 08/20/2017 14.3   11.5 - 14.5 % Final    Platelets 08/20/2017 182  150 - 350 K/uL Final    MPV 08/20/2017 8.6* 9.2 - 12.9 fL Final    Gran # 08/20/2017 6.1  1.8 - 7.7 K/uL Final    Lymph # 08/20/2017 1.4  1.0 - 4.8 K/uL Final    Mono # 08/20/2017 0.6  0.3 - 1.0 K/uL Final    Eos # 08/20/2017 0.2  0.0 - 0.5 K/uL Final    Baso # 08/20/2017 0.01  0.00 - 0.20 K/uL Final    Gran% 08/20/2017 72.8  38.0 - 73.0 % Final    Lymph% 08/20/2017 17.1* 18.0 - 48.0 % Final    Mono% 08/20/2017 7.3  4.0 - 15.0 % Final    Eosinophil% 08/20/2017 2.3  0.0 - 8.0 % Final    Basophil% 08/20/2017 0.1  0.0 - 1.9 % Final    Differential Method 08/20/2017 Automated   Final    Sodium 08/20/2017 139  136 - 145 mmol/L Final    Potassium 08/20/2017 4.2  3.5 - 5.1 mmol/L Final    Chloride 08/20/2017 108  95 - 110 mmol/L Final    CO2 08/20/2017 24  23 - 29 mmol/L Final    Glucose 08/20/2017 96  70 - 110 mg/dL Final    BUN, Bld 08/20/2017 26* 8 - 23 mg/dL Final    Creatinine 08/20/2017 2.5* 0.5 - 1.4 mg/dL Final    Calcium 08/20/2017 8.5* 8.7 - 10.5 mg/dL Final    Total Protein 08/20/2017 6.9  6.0 - 8.4 g/dL Final    Albumin 08/20/2017 2.8* 3.5 - 5.2 g/dL Final    Total Bilirubin 08/20/2017 0.3  0.1 - 1.0 mg/dL Final    Comment: For infants and newborns, interpretation of results should be based  on gestational age, weight and in agreement with clinical  observations.  Premature Infant recommended reference ranges:  Up to 24 hours.............<8.0 mg/dL  Up to 48 hours............<12.0 mg/dL  3-5 days..................<15.0 mg/dL  6-29 days.................<15.0 mg/dL      Alkaline Phosphatase 08/20/2017 57  55 - 135 U/L Final    AST 08/20/2017 26  10 - 40 U/L Final    ALT 08/20/2017 20  10 - 44 U/L Final    Anion Gap 08/20/2017 7* 8 - 16 mmol/L Final    eGFR if  08/20/2017 19* >60 mL/min/1.73 m^2 Final    eGFR if non  08/20/2017 17* >60 mL/min/1.73 m^2 Final    Comment: Calculation used to obtain the  estimated glomerular filtration  rate (eGFR) is the CKD-EPI equation. Since race is unknown   in our information system, the eGFR values for   -American and Non--American patients are given   for each creatinine result.      Group & Rh 08/20/2017 A POS   Final    Indirect Bon 08/20/2017 NEG   Final    Prothrombin Time 08/20/2017 10.0  9.0 - 12.5 sec Final    INR 08/20/2017 0.9  0.8 - 1.2 Final    Comment: Coumadin Therapy:  2.0 - 3.0 for INR for all indicators except mechanical heart valves  and antiphospholipid syndromes which should use 2.5 - 3.5.      aPTT 08/20/2017 28.0  21.0 - 32.0 sec Final    UNIT NUMBER 08/20/2017 [id number]   Preliminary    PRODUCT CODE 08/20/2017 [id number]   Preliminary    DISPENSE STATUS 08/20/2017 ISSUED   Preliminary    CODING SYSTEM 08/20/2017 [id number]   Preliminary    Unit Blood Type Code 08/20/2017 9500   Preliminary    Unit Blood Type 08/20/2017 O NEG   Preliminary    Unit Expiration 08/20/2017 [id number]   Preliminary         Significant Imaging: I have reviewed and interpreted all pertinent imaging results/findings within the past 24 hours.    Assessment/Plan:     Active Diagnoses:    Diagnosis Date Noted POA    PRINCIPAL PROBLEM:  Hematemesis [K92.0] 08/20/2017 Yes    Chronic anemia [D64.9] 08/20/2017 Yes    Occult blood positive stool [R19.5] 08/20/2017 Yes    CKD (chronic kidney disease), stage IV [N18.4] 08/20/2017 Unknown    Malignant neoplasm of overlapping sites of bladder [C67.8] 06/16/2017 Yes    Essential hypertension [I10] 06/15/2017 Yes      Problems Resolved During this Admission:    Diagnosis Date Noted Date Resolved POA     VTE Risk Mitigation         Ordered     Medium Risk of VTE  Once      08/20/17 0344        Admit to Hospital Medicine    1. Hematemesis and chronic anemia  -one unit PRBC transfusion  -GI consult    2. HTN  -controlled    3. CKD   -baseline    4. Prophylaxis  -SCD, PPI    Spent adequate time in obtaining  history and explaining differentials    50 minutes spent during this visit of which greater than 50% devoted to face-face counseling and coordination of care regarding diagnosis and management plan        Kvng Grubbs MD  Department of Hospital Medicine   [facility]

## 2017-08-20 NOTE — DISCHARGE SUMMARY
[facility] Hospital Medicine  Discharge Summary      Patient Name: Yamini Ellis  MRN: [id number]  Admission Date: 8/20/2017  Hospital Length of Stay: 0 days  Discharge Date and Time:  08/20/2017 1:46 PM  Attending Physician: Debbie Shane MD   Discharging Provider: Debbie Shane MD  Primary Care Provider: Merrill Shaver MD      HPI:   Ms. Ellis is an 87 year old resident of [facility] brought to the ED after an episode of coffee ground emesis and blood in the stool.  She had no complaints on arrival.  She was anemic with hemoglobin level 7.3 (stable since last checked in June) and was admitted for transfusion.    Patient's medical history is significant for CKD IV and invasive high-grade bladder cancer that is not operable and is no longer being treated.  She was hospitalized here in June for acute kidney injury due to bladder outlet obstruction and was discharged back to the nursing home with hospice services, but her POA had misplaced her paperwork and could not sign her in.    Hospital Course:   Patient was admitted and transfused one unit of PRBCs, then monitored overnight.  Initial plan was to consult GI for endoscopy, but as patient's hemoglobin level had not changed, she was tolerating a diet and was hemodynamically stable I decided not to do anything invasive.  Patient is still an ideal candidate for hospice, but lacks the capability to understand how this service works.   met with her POA and suggested she obtain a copy of her paperwork from [employer] as it was notarized there, and then get new original paperwork from the  who signed the first set.  Hospice orders will have to wait until they get this situation straightened out.      Patient was at her baseline level of functioning at discharge and was hemodynamically stable.         Consults:       Final Active Diagnoses:    Diagnosis Date Noted POA    PRINCIPAL PROBLEM:  Hematemesis [K92.0] 08/20/2017  Yes    Chronic anemia [D64.9] 08/20/2017 Yes    Occult blood positive stool [R19.5] 08/20/2017 Yes    CKD (chronic kidney disease), stage IV [N18.4] 08/20/2017 Yes    Malignant neoplasm of overlapping sites of bladder [C67.8] 06/16/2017 Yes    Essential hypertension [I10] 06/15/2017 Yes      Problems Resolved During this Admission:    Diagnosis Date Noted Date Resolved POA      No new Assessment & Plan notes have been filed under this hospital service since the last note was generated.  Service: Hospital Medicine      Discharged Condition:  Terminal condition due to inoperable bladder cancer    Disposition: Nursing Facility    Follow Up:  Follow-up Information     Merrill Shaver MD.    Specialty:  General Practice  Why:  Follow up for the next available appointment  Contact information:  [address] [address] [phone]                 Patient Instructions:     Diet general     Activity as tolerated       Medications:  Reconciled Home Medications:   Current Discharge Medication List      CONTINUE these medications which have NOT CHANGED    Details   cyanocobalamin (VITAMIN B-12) 1000 MCG tablet Take 100 mcg by mouth once daily.      levetiracetam (KEPPRA) 250 MG Tab Take 250 mg by mouth 2 (two) times daily.      mirabegron (MYRBETRIQ) 25 mg Tb24 ER tablet Take 25 mg by mouth once daily.         STOP taking these medications       atorvastatin (LIPITOR) 20 MG tablet Comments:   Reason for Stopping:         ferrous gluconate (FERGON) 325 MG Tab Comments:   Reason for Stopping:         potassium chloride (KLOR-CON) 10 MEQ TbSR Comments:   Reason for Stopping:         clopidogrel (PLAVIX) 75 mg tablet Comments:   Reason for Stopping:             Time spent on the discharge of patient: >30 minutes    HOS POC IP DISCHARGE SUMMARY    Debbie Whitfield MD  Department of Hospital Medicine  [facility]

## 2017-08-20 NOTE — ED NOTES
"Pt presented to ED via [facility] with c/o of  having coffee ground emesis. Pt denies any nausea or pain.  Pt is alert to person, place and time. Pt is contracted into a fetal position lying on right side. Pt says " I never lay on my back" Pt  Is placed on cardiac, bp, and pulse ox monitoring.Bed is in lowest positioin, side rails upx2, and call light within reach.  Pt is visible from the nurses station.  Will continue to monitor.   "

## 2017-08-20 NOTE — ED PROVIDER NOTES
Encounter Date: 8/20/2017    SCRIBE #1 NOTE: I, Rudy Atkins, am scribing for, and in the presence of,  Dr. Echevarria . I have scribed the entire note.       History     Chief Complaint   Patient presents with    Hematemesis     coffe ground emesis,  per [facility] X 1 at about 10:30 tonight     Time seen by provider: 1:36  AM    This is a 87 y.o. female who presents with complaint of hematemesis. She experienced the symptoms 3 hours ago. The nursing home noted that she had one episode of coffee ground emesis.  She denies taking any medication to subside the symptoms.  She also denies abdominal pain, urinary symptoms, dizziness, light headedness, headache, nausea back pain,fever, chills. Patient does state that she feels fine.             The history is provided by the patient.     Review of patient's allergies indicates:   Allergen Reactions    Lactose      Past Medical History:   Diagnosis Date    Anemia of decreased vitamin B12 absorption     Aphasia     Coronary atherosclerosis of unspecified type of vessel, native or graft     Epilepsy     Hemiparesis affecting right side as late effect of cerebrovascular accident     Hypertension     Hypokalemia     Hypothyroidism     Other and unspecified hyperlipidemia     Stroke     Transient ischemic attack (TIA), and cerebral infarction without residual deficits     Unspecified hypothyroidism      History reviewed. No pertinent surgical history.  History reviewed. No pertinent family history.  Social History   Substance Use Topics    Smoking status: Former Smoker    Smokeless tobacco: Never Used    Alcohol use No     Review of Systems   Constitutional: Negative for appetite change, chills and fever.   HENT: Negative for hearing loss and sore throat.    Respiratory: Negative for shortness of breath.    Cardiovascular: Negative for chest pain.   Gastrointestinal: Positive for vomiting. Negative for blood in stool, diarrhea, nausea and rectal pain.    Genitourinary: Negative for dysuria.   Musculoskeletal: Negative for back pain.   Skin: Negative for rash.   Neurological: Negative for weakness.   Hematological: Does not bruise/bleed easily.       Physical Exam     Initial Vitals [08/20/17 0024]   BP Pulse Resp Temp SpO2   (!) 124/58 76 20 97.5 °F (36.4 °C) 99 %      MAP       80         Physical Exam    Nursing note and vitals reviewed.  Constitutional: She appears well-developed and well-nourished. She is not diaphoretic. No distress.   HENT:   Head: Normocephalic and atraumatic.   Right Ear: External ear normal.   Left Ear: External ear normal.   Mouth/Throat: Oropharynx is clear and moist.   Eyes: Conjunctivae and EOM are normal.   Neck: Normal range of motion. Neck supple. No JVD present.   Cardiovascular: Normal rate, regular rhythm and normal heart sounds. Exam reveals no gallop and no friction rub.    No murmur heard.  Pulmonary/Chest: Breath sounds normal. No respiratory distress. She has no wheezes. She has no rhonchi. She has no rales. She exhibits no tenderness.   Abdominal: Soft. Bowel sounds are normal. There is no tenderness. There is no rebound and no guarding.   Genitourinary: Rectal exam shows guaiac positive stool. Guaiac positive stool. : Acceptable.  Genitourinary Comments: Light brown stool      Musculoskeletal: Normal range of motion. She exhibits no edema or tenderness.   Neurological: She is alert. She has normal strength.   Alert to person and place    Skin: Skin is warm and dry. No rash noted.         ED Course   Procedures  Labs Reviewed   CBC W/ AUTO DIFFERENTIAL - Abnormal; Notable for the following:        Result Value    RBC 2.27 (*)     Hemoglobin 7.3 (*)     Hematocrit 21.0 (*)     MCH 32.2 (*)     MPV 8.6 (*)     Lymph% 17.1 (*)     All other components within normal limits   COMPREHENSIVE METABOLIC PANEL - Abnormal; Notable for the following:     BUN, Bld 26 (*)     Creatinine 2.5 (*)     Calcium 8.5 (*)      Albumin 2.8 (*)     Anion Gap 7 (*)     eGFR if  19 (*)     eGFR if non  17 (*)     All other components within normal limits   PROTIME-INR   APTT   TYPE & SCREEN     EKG Readings: (Independently Interpreted)   EKG Reading (12:50 AM): Sinus rhythm with a rate of 79. No ST or T wave changes. No STEMI. Motion artifact.           Medical Decision Making:   Independently Interpreted Test(s):   I have ordered and independently interpreted EKG Reading(s) - see prior notes  Clinical Tests:   Lab Tests: Ordered and Reviewed  Medical Tests: Ordered and Reviewed  ED Management:  1:47 AM Case discussed with Dr. Grubbs of [facility] service, will admit the patient and request transfusion of 1 unit of blood    Additional MDM:   Comments: 87-year-old female sent from the nursing home after an episode of hematemesis.  The patient is on Plavix.  According to the patient she did not vomit blood but I am not certain how reliable her history is.  On exam she was well-appearing and hemodynamically stable with a guaiac positive rectal exam.  Labs are obtained and significant for anemia which appears to be stable compared to her most recent labs.  I did discuss the case with the [facility] for admission for serial H&H.  The [facility] did request that the patient be transfused 1 unit of packed red blood cells.  She was started on protonix and admitted to the hospitalist service..          Scribe Attestation:   Scribe #1: I performed the above scribed service and the documentation accurately describes the services I performed. I attest to the accuracy of the note.    Attending Attestation:           Physician Attestation for Scribe:  Physician Attestation Statement for Scribe #1: I, Dr. Echevarria, reviewed documentation, as scribed by Rudy Atkins  in my presence, and it is both accurate and complete.                 ED Course     Clinical Impression:     1. Hematemesis    2. Chronic anemia    3. Essential  hypertension    4. Occult blood positive stool    5. Malignant neoplasm of overlapping sites of bladder                                 Rae Echevarria MD  08/20/17 0621

## 2017-08-20 NOTE — HPI
Ms. Ellis is an 87 year old resident of [facility] brought to the ED after an episode of coffee ground emesis and blood in the stool.  She had no complaints on arrival.  She was anemic with hemoglobin level 7.3 (stable since last checked in June) and was admitted for transfusion.    Patient's medical history is significant for CKD IV and invasive high-grade bladder cancer that is not operable and is no longer being treated.  She was hospitalized here in June for acute kidney injury due to bladder outlet obstruction and was discharged back to the nursing home with hospice services, but her POA had misplaced her paperwork and could not sign her in.

## 2017-08-20 NOTE — PLAN OF CARE
"Writer sent DC orders to [facility] via [facility] and fax [phone]. Per Bea at [facility] patient is ready to be recieved. Patient will return via [facility] wheelchair van service [phone]. Per Jesse at [facility], "it'll be a while to locate an available  but I will call you with an ETA when I speak with my supervisor". Writer provided [facility] with  telephone number as well as to [facility] nurses station.    1500-[facility] called to inform writer that transport will not be able to arrange transport until 1830. Writer cancelled [facility] transport.     1529-Writer called [facility] to transport patient. Per RUTH Westfall, patient is contracted and cannot be lifted into a wheelchair as [facility] [place] not have a stand-up-lift. RUTH Christianson from [facility] confirmed their staff utilizes stand up lift to get patient into wheelchair. This information was explained to [facility] dispatch. Anticipate transport within one hour ([phone]).  "

## 2017-08-20 NOTE — PLAN OF CARE
"Problem: Fall Risk (Adult)  Goal: Absence of Falls  Patient will demonstrate the desired outcomes by discharge/transition of care.   Outcome: Ongoing (interventions implemented as appropriate)  Patient is a fall risk.  Yellow bracelet and socks available.  Bed alarm turned on.  Awaiting a room closer to the desk to become available.    Problem: Patient Care Overview  Goal: Plan of Care Review  Outcome: Ongoing (interventions implemented as appropriate)  Current plan of care regarding lab work and PRBC transfusion x one bag reviewed with the patient before doing vitals.  Patient responds with "we all gotta do what we gotta".    Problem: Pressure Ulcer Risk (David Scale) (Adult,Obstetrics,Pediatric)  Goal: Skin Integrity  Patient will demonstrate the desired outcomes by discharge/transition of care.   Outcome: Ongoing (interventions implemented as appropriate)  Slightly red sacral area.  Patient cleansed and barrier cream applied.  Turning q 2 hours for comfort and to prevent skin breakdown.      "

## 2017-08-20 NOTE — PLAN OF CARE
[facility]     Department of Hospital Medicine     [address] [place] [address]     [phone] after hours  [phone] fax       NURSING HOME ORDERS    08/20/2017    Admit to Nursing Home:  Regular Bed         Diagnoses:  Active Hospital Problems    Diagnosis  POA    *Hematemesis [K92.0]  Yes    Chronic anemia [D64.9]  Yes    Occult blood positive stool [R19.5]  Yes    CKD (chronic kidney disease), stage IV [N18.4]  Yes    Malignant neoplasm of overlapping sites of bladder [C67.8]  Yes    Essential hypertension [I10]  Yes      Resolved Hospital Problems    Diagnosis Date Resolved POA   No resolved problems to display.       Allergies:  Review of patient's allergies indicates:   Allergen Reactions    Lactose        Vitals:  Per routine    Diet:  Mechanical soft with chopped meats, Mighty Shake with each meal.    Activities:   As tolerated    LABS:  Per facility protocol    Nursing Precautions:    - Aspiration precautions:             - Assistance with meals            - Upright 90 degrees befor during and after meals             -  Suction at bedside          - Fall precautions per nursing home protocol   - Seizure precaution per [facility] protocol   - Decubitus precautions:        -  for positioning   - Pressure reducing foam mattress   - Turn patient every two hours. Use wedge pillows to anchor patient       Medications: Discontinue all previous medication orders, if any. See new list below.   Yamini Ellis   Home Medication Instructions JC:[id number]    Printed on:08/20/17 2423   Medication Information                      cyanocobalamin (VITAMIN B-12) 1000 MCG tablet  Take 100 mcg by mouth once daily.             levetiracetam (KEPPRA) 250 MG Tab  Take 250 mg by mouth 2 (two) times daily.             mirabegron (MYRBETRIQ) 25 mg Tb24 ER tablet  Take 25 mg by mouth once daily.                       _________________________________  Debbie  EUGENE Whitfield MD  08/20/2017

## 2017-08-20 NOTE — HOSPITAL COURSE
Patient was admitted and transfused one unit of PRBCs, then monitored overnight.  Initial plan was to consult GI for endoscopy, but as patient's hemoglobin level had not changed, she was tolerating a diet and was hemodynamically stable I decided not to do anything invasive.  Patient is still an ideal candidate for hospice, but lacks the capability to understand how this service works.   met with her POA and suggested she obtain a copy of her paperwork from [employer] as it was notarized there, and then get new original paperwork from the  who signed the first set.  Hospice orders will have to wait until they get this situation straightened out.      Patient was at her baseline level of functioning at discharge and was hemodynamically stable.

## 2017-08-20 NOTE — PLAN OF CARE
DC Planning:    Writer met with patient and fiduciary Ms. Tafoya at bedside to discuss plan of care. Per Dr. Shane, patient to DC back to [facility] today. Ms. Tafoya reports upon last admission, [facility] hospice was ordered. Upon [facility] liaison arriving to [facility] to sign consents, liaison explained  could not sign as patient's niece is legal POA. Per Ms. Tafoya, she and niece were at [facility] where they were instructed to fill out paperwork for fiduciary and POA, they were then instructed to get niece's forms notarized (at their hospital [facility]). Writer recommended for family (Ms. Tafoya and reubenece) to contact [facility] medical records as well as [facility] where documents were signed. No other needs expressed. CM to remain supportive.

## 2017-11-09 ENCOUNTER — HOSPITAL ENCOUNTER (INPATIENT)
Facility: OTHER | Age: 82
LOS: 1 days | Discharge: HOSPICE | DRG: 177 | End: 2017-11-10
Attending: EMERGENCY MEDICINE | Admitting: EMERGENCY MEDICINE
Payer: MEDICARE

## 2017-11-09 DIAGNOSIS — R05.9 COUGH: ICD-10-CM

## 2017-11-09 DIAGNOSIS — T17.908A ASPIRATION INTO AIRWAY, INITIAL ENCOUNTER: Primary | ICD-10-CM

## 2017-11-09 DIAGNOSIS — K92.2 ACUTE UPPER GI BLEED: ICD-10-CM

## 2017-11-09 DIAGNOSIS — N17.9 ACUTE RENAL FAILURE: ICD-10-CM

## 2017-11-09 DIAGNOSIS — N17.9 ACUTE RENAL FAILURE, UNSPECIFIED ACUTE RENAL FAILURE TYPE: ICD-10-CM

## 2017-11-09 DIAGNOSIS — D50.0 ANEMIA, BLOOD LOSS: ICD-10-CM

## 2017-11-09 LAB
ABO + RH BLD: NORMAL
ANION GAP SERPL CALC-SCNC: 17 MMOL/L
ANISOCYTOSIS BLD QL SMEAR: SLIGHT
BASOPHILS # BLD AUTO: ABNORMAL K/UL
BASOPHILS NFR BLD: 0 %
BLD GP AB SCN CELLS X3 SERPL QL: NORMAL
BNP SERPL-MCNC: 108 PG/ML
BUN SERPL-MCNC: 115 MG/DL
BURR CELLS BLD QL SMEAR: ABNORMAL
CALCIUM SERPL-MCNC: 8.6 MG/DL
CHLORIDE SERPL-SCNC: 113 MMOL/L
CO2 SERPL-SCNC: 9 MMOL/L
CREAT SERPL-MCNC: 11 MG/DL
DIFFERENTIAL METHOD: ABNORMAL
EOSINOPHIL # BLD AUTO: ABNORMAL K/UL
EOSINOPHIL NFR BLD: 1 %
ERYTHROCYTE [DISTWIDTH] IN BLOOD BY AUTOMATED COUNT: 15.2 %
EST. GFR  (AFRICAN AMERICAN): 3 ML/MIN/1.73 M^2
EST. GFR  (NON AFRICAN AMERICAN): 3 ML/MIN/1.73 M^2
GIANT PLATELETS BLD QL SMEAR: PRESENT
GLUCOSE SERPL-MCNC: 99 MG/DL
HCT VFR BLD AUTO: 19.7 %
HGB BLD-MCNC: 6.9 G/DL
HYPOCHROMIA BLD QL SMEAR: ABNORMAL
LYMPHOCYTES # BLD AUTO: ABNORMAL K/UL
LYMPHOCYTES NFR BLD: 6.1 %
MCH RBC QN AUTO: 31.2 PG
MCHC RBC AUTO-ENTMCNC: 35 G/DL
MCV RBC AUTO: 89 FL
MONOCYTES # BLD AUTO: ABNORMAL K/UL
MONOCYTES NFR BLD: 0 %
NEUTROPHILS # BLD AUTO: ABNORMAL K/UL
NEUTROPHILS NFR BLD: 90.9 %
NEUTS BAND NFR BLD MANUAL: 2 %
PLATELET # BLD AUTO: 96 K/UL
PLATELET BLD QL SMEAR: ABNORMAL
PMV BLD AUTO: 8.9 FL
POIKILOCYTOSIS BLD QL SMEAR: SLIGHT
POLYCHROMASIA BLD QL SMEAR: ABNORMAL
POTASSIUM SERPL-SCNC: 5.7 MMOL/L
RBC # BLD AUTO: 2.21 M/UL
SCHISTOCYTES BLD QL SMEAR: PRESENT
SODIUM SERPL-SCNC: 139 MMOL/L
WBC # BLD AUTO: 11.3 K/UL

## 2017-11-09 PROCEDURE — 85007 BL SMEAR W/DIFF WBC COUNT: CPT

## 2017-11-09 PROCEDURE — 81000 URINALYSIS NONAUTO W/SCOPE: CPT

## 2017-11-09 PROCEDURE — 83880 ASSAY OF NATRIURETIC PEPTIDE: CPT

## 2017-11-09 PROCEDURE — 20000000 HC ICU ROOM

## 2017-11-09 PROCEDURE — 87086 URINE CULTURE/COLONY COUNT: CPT

## 2017-11-09 PROCEDURE — G0378 HOSPITAL OBSERVATION PER HR: HCPCS

## 2017-11-09 PROCEDURE — 85027 COMPLETE CBC AUTOMATED: CPT

## 2017-11-09 PROCEDURE — 86850 RBC ANTIBODY SCREEN: CPT

## 2017-11-09 PROCEDURE — 82570 ASSAY OF URINE CREATININE: CPT

## 2017-11-09 PROCEDURE — 25000242 PHARM REV CODE 250 ALT 637 W/ HCPCS: Performed by: EMERGENCY MEDICINE

## 2017-11-09 PROCEDURE — 80048 BASIC METABOLIC PNL TOTAL CA: CPT

## 2017-11-09 PROCEDURE — 86920 COMPATIBILITY TEST SPIN: CPT

## 2017-11-09 PROCEDURE — 96365 THER/PROPH/DIAG IV INF INIT: CPT

## 2017-11-09 PROCEDURE — C9113 INJ PANTOPRAZOLE SODIUM, VIA: HCPCS | Performed by: EMERGENCY MEDICINE

## 2017-11-09 PROCEDURE — 99285 EMERGENCY DEPT VISIT HI MDM: CPT | Mod: 25

## 2017-11-09 PROCEDURE — 94640 AIRWAY INHALATION TREATMENT: CPT

## 2017-11-09 PROCEDURE — 86900 BLOOD TYPING SEROLOGIC ABO: CPT

## 2017-11-09 PROCEDURE — 87040 BLOOD CULTURE FOR BACTERIA: CPT | Mod: 59

## 2017-11-09 PROCEDURE — 84300 ASSAY OF URINE SODIUM: CPT

## 2017-11-09 PROCEDURE — 84156 ASSAY OF PROTEIN URINE: CPT

## 2017-11-09 PROCEDURE — 94761 N-INVAS EAR/PLS OXIMETRY MLT: CPT

## 2017-11-09 PROCEDURE — 51702 INSERT TEMP BLADDER CATH: CPT

## 2017-11-09 PROCEDURE — 96375 TX/PRO/DX INJ NEW DRUG ADDON: CPT

## 2017-11-09 PROCEDURE — 87205 SMEAR GRAM STAIN: CPT

## 2017-11-09 PROCEDURE — 63600175 PHARM REV CODE 636 W HCPCS: Performed by: EMERGENCY MEDICINE

## 2017-11-09 RX ORDER — HYDROCODONE BITARTRATE AND ACETAMINOPHEN 500; 5 MG/1; MG/1
TABLET ORAL
Status: DISCONTINUED | OUTPATIENT
Start: 2017-11-09 | End: 2017-11-10 | Stop reason: HOSPADM

## 2017-11-09 RX ORDER — IPRATROPIUM BROMIDE AND ALBUTEROL SULFATE 2.5; .5 MG/3ML; MG/3ML
3 SOLUTION RESPIRATORY (INHALATION)
Status: COMPLETED | OUTPATIENT
Start: 2017-11-09 | End: 2017-11-09

## 2017-11-09 RX ORDER — PANTOPRAZOLE SODIUM 40 MG/10ML
80 INJECTION, POWDER, LYOPHILIZED, FOR SOLUTION INTRAVENOUS
Status: COMPLETED | OUTPATIENT
Start: 2017-11-09 | End: 2017-11-09

## 2017-11-09 RX ADMIN — IPRATROPIUM BROMIDE AND ALBUTEROL SULFATE 3 ML: .5; 3 SOLUTION RESPIRATORY (INHALATION) at 10:11

## 2017-11-09 RX ADMIN — DEXTROSE 8 MG/HR: 50 INJECTION, SOLUTION INTRAVENOUS at 11:11

## 2017-11-09 RX ADMIN — PANTOPRAZOLE SODIUM 80 MG: 40 INJECTION, POWDER, FOR SOLUTION INTRAVENOUS at 11:11

## 2017-11-10 VITALS
HEIGHT: 66 IN | OXYGEN SATURATION: 95 % | HEART RATE: 92 BPM | WEIGHT: 133.63 LBS | TEMPERATURE: 99 F | SYSTOLIC BLOOD PRESSURE: 114 MMHG | DIASTOLIC BLOOD PRESSURE: 64 MMHG | BODY MASS INDEX: 21.47 KG/M2 | RESPIRATION RATE: 16 BRPM

## 2017-11-10 PROBLEM — S31.809A WOUND OF BUTTOCK: Status: ACTIVE | Noted: 2017-11-10

## 2017-11-10 PROBLEM — K92.2 GI BLEED: Status: ACTIVE | Noted: 2017-11-10

## 2017-11-10 LAB
ALBUMIN SERPL BCP-MCNC: 2.8 G/DL
ALP SERPL-CCNC: 92 U/L
ALT SERPL W/O P-5'-P-CCNC: 38 U/L
ANION GAP SERPL CALC-SCNC: 16 MMOL/L
ANISOCYTOSIS BLD QL SMEAR: SLIGHT
AST SERPL-CCNC: 48 U/L
BACTERIA #/AREA URNS HPF: ABNORMAL /HPF
BASOPHILS # BLD AUTO: 0.01 K/UL
BASOPHILS NFR BLD: 0.1 %
BILIRUB SERPL-MCNC: 0.7 MG/DL
BILIRUB UR QL STRIP: NEGATIVE
BLD PROD TYP BPU: NORMAL
BLD PROD TYP BPU: NORMAL
BLOOD UNIT EXPIRATION DATE: NORMAL
BLOOD UNIT EXPIRATION DATE: NORMAL
BLOOD UNIT TYPE CODE: 6200
BLOOD UNIT TYPE CODE: 6200
BLOOD UNIT TYPE: NORMAL
BLOOD UNIT TYPE: NORMAL
BUN SERPL-MCNC: 113 MG/DL
BURR CELLS BLD QL SMEAR: ABNORMAL
CALCIUM SERPL-MCNC: 8 MG/DL
CHLORIDE SERPL-SCNC: 114 MMOL/L
CLARITY UR: ABNORMAL
CO2 SERPL-SCNC: 5 MMOL/L
CODING SYSTEM: NORMAL
CODING SYSTEM: NORMAL
COLOR UR: YELLOW
CREAT SERPL-MCNC: 10.7 MG/DL
CREAT UR-MCNC: 71 MG/DL
DIFFERENTIAL METHOD: ABNORMAL
DISPENSE STATUS: NORMAL
DISPENSE STATUS: NORMAL
EOSINOPHIL # BLD AUTO: 0 K/UL
EOSINOPHIL NFR BLD: 0 %
EOSINOPHIL URNS QL WRIGHT STN: NORMAL
ERYTHROCYTE [DISTWIDTH] IN BLOOD BY AUTOMATED COUNT: 14.8 %
EST. GFR  (AFRICAN AMERICAN): 3 ML/MIN/1.73 M^2
EST. GFR  (NON AFRICAN AMERICAN): 3 ML/MIN/1.73 M^2
GIANT PLATELETS BLD QL SMEAR: PRESENT
GLUCOSE SERPL-MCNC: 216 MG/DL
GLUCOSE UR QL STRIP: NEGATIVE
HCT VFR BLD AUTO: 27.9 %
HGB BLD-MCNC: 9.7 G/DL
HGB UR QL STRIP: ABNORMAL
HYALINE CASTS #/AREA URNS LPF: 0 /LPF
HYPOCHROMIA BLD QL SMEAR: ABNORMAL
KETONES UR QL STRIP: NEGATIVE
LEUKOCYTE ESTERASE UR QL STRIP: ABNORMAL
LYMPHOCYTES # BLD AUTO: 0.3 K/UL
LYMPHOCYTES NFR BLD: 3.3 %
MAGNESIUM SERPL-MCNC: 1.5 MG/DL
MCH RBC QN AUTO: 31.1 PG
MCHC RBC AUTO-ENTMCNC: 34.8 G/DL
MCV RBC AUTO: 89 FL
MICROSCOPIC COMMENT: ABNORMAL
MONOCYTES # BLD AUTO: 0.3 K/UL
MONOCYTES NFR BLD: 3 %
NEUTROPHILS # BLD AUTO: 8.3 K/UL
NEUTROPHILS NFR BLD: 93.6 %
NITRITE UR QL STRIP: NEGATIVE
NRBC BLD-RTO: 1 /100 WBC
PH UR STRIP: 6 [PH] (ref 5–8)
PHOSPHATE SERPL-MCNC: 4.9 MG/DL
PLATELET # BLD AUTO: 87 K/UL
PLATELET BLD QL SMEAR: ABNORMAL
PMV BLD AUTO: 9.3 FL
POIKILOCYTOSIS BLD QL SMEAR: SLIGHT
POLYCHROMASIA BLD QL SMEAR: ABNORMAL
POTASSIUM SERPL-SCNC: 5.3 MMOL/L
PROT SERPL-MCNC: 7 G/DL
PROT UR QL STRIP: ABNORMAL
PROT UR-MCNC: 85 MG/DL
RBC # BLD AUTO: 3.12 M/UL
RBC #/AREA URNS HPF: >100 /HPF (ref 0–4)
SODIUM SERPL-SCNC: 135 MMOL/L
SODIUM UR-SCNC: 45 MMOL/L
SP GR UR STRIP: 1.01 (ref 1–1.03)
TRANS ERYTHROCYTES VOL PATIENT: NORMAL ML
TRANS ERYTHROCYTES VOL PATIENT: NORMAL ML
URN SPEC COLLECT METH UR: ABNORMAL
UROBILINOGEN UR STRIP-ACNC: NEGATIVE EU/DL
WBC # BLD AUTO: 9.01 K/UL
WBC #/AREA URNS HPF: >100 /HPF (ref 0–5)
WBC CLUMPS URNS QL MICRO: ABNORMAL

## 2017-11-10 PROCEDURE — C9113 INJ PANTOPRAZOLE SODIUM, VIA: HCPCS | Performed by: EMERGENCY MEDICINE

## 2017-11-10 PROCEDURE — 83735 ASSAY OF MAGNESIUM: CPT

## 2017-11-10 PROCEDURE — 27000221 HC OXYGEN, UP TO 24 HOURS

## 2017-11-10 PROCEDURE — 25000003 PHARM REV CODE 250: Performed by: EMERGENCY MEDICINE

## 2017-11-10 PROCEDURE — 36430 TRANSFUSION BLD/BLD COMPNT: CPT

## 2017-11-10 PROCEDURE — 94761 N-INVAS EAR/PLS OXIMETRY MLT: CPT

## 2017-11-10 PROCEDURE — 63600175 PHARM REV CODE 636 W HCPCS: Performed by: EMERGENCY MEDICINE

## 2017-11-10 PROCEDURE — 25000003 PHARM REV CODE 250: Performed by: NURSE PRACTITIONER

## 2017-11-10 PROCEDURE — 80053 COMPREHEN METABOLIC PANEL: CPT

## 2017-11-10 PROCEDURE — 99223 1ST HOSP IP/OBS HIGH 75: CPT | Mod: ,,, | Performed by: NURSE PRACTITIONER

## 2017-11-10 PROCEDURE — 63600175 PHARM REV CODE 636 W HCPCS: Performed by: NURSE PRACTITIONER

## 2017-11-10 PROCEDURE — 99900035 HC TECH TIME PER 15 MIN (STAT)

## 2017-11-10 PROCEDURE — P9021 RED BLOOD CELLS UNIT: HCPCS

## 2017-11-10 PROCEDURE — 36415 COLL VENOUS BLD VENIPUNCTURE: CPT

## 2017-11-10 PROCEDURE — 85025 COMPLETE CBC W/AUTO DIFF WBC: CPT

## 2017-11-10 PROCEDURE — 84100 ASSAY OF PHOSPHORUS: CPT

## 2017-11-10 RX ORDER — IPRATROPIUM BROMIDE AND ALBUTEROL SULFATE 2.5; .5 MG/3ML; MG/3ML
3 SOLUTION RESPIRATORY (INHALATION) EVERY 6 HOURS PRN
Status: DISCONTINUED | OUTPATIENT
Start: 2017-11-10 | End: 2017-11-10 | Stop reason: HOSPADM

## 2017-11-10 RX ORDER — LEVETIRACETAM 250 MG/1
250 TABLET ORAL 2 TIMES DAILY
Status: DISCONTINUED | OUTPATIENT
Start: 2017-11-10 | End: 2017-11-10

## 2017-11-10 RX ORDER — HEPARIN SODIUM 5000 [USP'U]/ML
5000 INJECTION, SOLUTION INTRAVENOUS; SUBCUTANEOUS EVERY 8 HOURS
Status: DISCONTINUED | OUTPATIENT
Start: 2017-11-10 | End: 2017-11-10

## 2017-11-10 RX ORDER — SODIUM CHLORIDE 0.9 % (FLUSH) 0.9 %
5 SYRINGE (ML) INJECTION
Status: DISCONTINUED | OUTPATIENT
Start: 2017-11-10 | End: 2017-11-10 | Stop reason: HOSPADM

## 2017-11-10 RX ORDER — ONDANSETRON 2 MG/ML
4 INJECTION INTRAMUSCULAR; INTRAVENOUS EVERY 8 HOURS PRN
Status: DISCONTINUED | OUTPATIENT
Start: 2017-11-10 | End: 2017-11-10 | Stop reason: HOSPADM

## 2017-11-10 RX ORDER — ACETAMINOPHEN 325 MG/1
650 TABLET ORAL EVERY 8 HOURS PRN
Status: DISCONTINUED | OUTPATIENT
Start: 2017-11-10 | End: 2017-11-10 | Stop reason: HOSPADM

## 2017-11-10 RX ADMIN — SODIUM BICARBONATE: 84 INJECTION, SOLUTION INTRAVENOUS at 07:11

## 2017-11-10 RX ADMIN — LEVETIRACETAM 250 MG: 100 INJECTION INTRAVENOUS at 02:11

## 2017-11-10 RX ADMIN — SODIUM BICARBONATE: 84 INJECTION, SOLUTION INTRAVENOUS at 01:11

## 2017-11-10 RX ADMIN — VANCOMYCIN HYDROCHLORIDE 1000 MG: 1 INJECTION, POWDER, LYOPHILIZED, FOR SOLUTION INTRAVENOUS at 02:11

## 2017-11-10 RX ADMIN — CEFTAZIDIME 1 G: 1 INJECTION, POWDER, FOR SOLUTION INTRAMUSCULAR; INTRAVENOUS at 02:11

## 2017-11-10 RX ADMIN — DEXTROSE 8 MG/HR: 50 INJECTION, SOLUTION INTRAVENOUS at 04:11

## 2017-11-10 NOTE — PLAN OF CARE
DC DISPO:    Patient to D/C via [facility] [phone] approximately 1600. ICU RN Keli and RN Renetta informed. [facility] to treat patient at [facility]. Family in agreement with D/C plan. CM to remain supportive.      11/10/17 1506   Final Note   Assessment Type Final Discharge Note   Discharge Disposition HospiceHome  ([place] at [facility])   Discharge plans and expectations educations in teach back method with documentation complete? Yes

## 2017-11-10 NOTE — ASSESSMENT & PLAN NOTE
Creatinine 11.   Nephrology consulted  -Dr. Aviles contacted by ER- D5W with 3 amps Bicarb recommended

## 2017-11-10 NOTE — PLAN OF CARE
Problem: Patient Care Overview  Goal: Plan of Care Review  Outcome: Outcome(s) achieved Date Met: 11/10/17  Pt remains free from falls, injury and additional skin breakdown. VSS. Pt turned Q2H and wound care assessed sacral excoriation. Pt too lethargic for PO intake. Pt transported to nursing home with hospice via [facility]. Report called prior to departure. Pt transported with upper dentures in place, lower dentures, personal belongings, wig and glasses in bag. Hospice RN states to leave Powers catheter in place and DC IVs X 3. Niece aware of pt transport back to [facility].     Unable to complete admission assessment as no family members were at the bedside for entirety of pt stay. Niece could not answer questions via telephone & pt too lethargic to answer any questions.

## 2017-11-10 NOTE — PLAN OF CARE
[facility]  Department of Hospital Medicine  [address] [place] [id number]  [phone] (phone)  [phone] (fax)                                   HOSPICE  ORDERS     11/10/2017    Admit to Hospice:  Home Service at [facility]    Diagnoses:  Active Hospital Problems    Diagnosis  POA    *Acute renal failure [N17.9]  Yes    GI bleed [K92.2]  Yes    Aspiration into airway [T17.908A]  Yes    Essential hypertension [I10]  Yes      Resolved Hospital Problems    Diagnosis Date Resolved POA   No resolved problems to display.       Vital Signs: Routine.    Allergies:  Review of patient's allergies indicates:   Allergen Reactions    Lactose        Diet: Regular    Activities: As tolerated    Nursing: Per Hospice Routine    Medications:       Comfort Care Medications and the medications listed below:    Current Discharge Medication List      CONTINUE these medications which have NOT CHANGED    Details   cyanocobalamin (VITAMIN B-12) 1000 MCG tablet Take 100 mcg by mouth once daily.      levetiracetam (KEPPRA) 250 MG Tab Take 250 mg by mouth 2 (two) times daily.      mirabegron (MYRBETRIQ) 25 mg Tb24 ER tablet Take 25 mg by mouth once daily.               _________________________________  Cassie Ross MD  11/10/2017

## 2017-11-10 NOTE — PHYSICIAN QUERY
"PT Name: Yamini Ellis  MR #: [id number]    Physician Query Form - Pneumonia Clarification     CDS/: Mary Bustos Rn             Contact information: [phone]    11/10 MD answered in Hosp med consult pn JT  This form is a permanent document in the medical record.    Query Date:  November 10, 2017    By submitting this query, we are merely seeking further clarification of documentation. Please utilize your independent clinical judgment when addressing the question(s) below.    The Medical record contains the following:   Indicators   Supporting Clinical Findings Location in Medical Record    "Pneumonia" documented     x Chest X-Ray: Chronic lung changes with increased interstitial and airspace opacification within the medial aspect of the left lower lung zone which could reflect atelectasis versus aspiration or developing pneumonia in the right clinical setting.     11/9 CXR   x PaO2    PaCO2     O2 sat O2 sats 925 room air  o2 sats 93% on 3L NC 11/10 Flowsheet    Cultures      x Treatment  Vancomycin IVPB ED x1     Indications of use: Pneumonia  Ceftazidime IVPB ED x1     Indications of use: Pneumonia    Ampicillin IVPB daily    Indications of use: Pneumonai   11/10 MAR   x Supplemental O2 O2 sat 2L NC   11/10 Flowsheet   x Other Aspiration into airway   Presumed &aspiration   -Vanc and Fortaz ordered per Nephrology recommendations    Presents with complaint of possible aspiration that began a couple hours ago. Per nursing  home staff, patient began to aspirate while eating dinner. When asked by nursing staff about cough, patient said she was eating too fast     Pt sounds congested with a non productive cough. Pt is using abdominal accessory muscles to assist breathing and has elevated breathing      11/10 HP                  11/9 RN  note       Provider, please specify type of pneumonia.    [  ] Aspiration Pneumonia    [  ] Bacterial Pneumonia (Specify organism): ______________________    [  ] Bacterial, " Gram Negative organism Pneumonia    [  ] Other type of pneumonia (please specify): ______________________________________    [  ] Clinically undetermined    Please document in your progress notes daily for the duration of treatment, until resolved, and include in your discharge summary.    .

## 2017-11-10 NOTE — PLAN OF CARE
DC Planning:    Writer spoke with patient's friend Ms. Tafoya [phone] who is patient's fiduciary. Per Ms. Tafoya, she spoke with [facility] who have no record of POA for patient or Hospice orders. Writer spoke with NIKOLAS Mari in regards to obtaining clarification of NOK to sign consents for hospice care.  to speak with patient's niece who is NOK to determine she is willing to sign consents and call writer with information in this regard.     11/10/17 1235   Discharge Assessment   Assessment Type Discharge Planning Assessment   Confirmed/corrected address and phone number on facesheet? Yes   Assessment information obtained from? Caregiver;Medical Record   Prior to hospitilization cognitive status: Unable to Assess   Prior to hospitalization functional status: Completely Dependent   Current cognitive status: Unable to Assess   Current Functional Status: Completely Dependent   Facility Arrived From: [facility]   Lives With facility resident  ([facility])   Able to Return to Prior Arrangements yes   Is patient able to care for self after discharge? No   Who are your caregiver(s) and their phone number(s)? Lottie Berrios, friend/fiduciary, [phone]   Patient currently being followed by outpatient case management? No   Patient currently receives any other outside agency services? No   Equipment Currently Used at Home wheelchair;hospital bed;bedside commode   Do you have any problems affording any of your prescribed medications? No   Is the patient taking medications as prescribed? yes   Does the patient have transportation home? Yes   Transportation Available ambulance   Discharge Plan A Hospice/home  (Hospice at [facility])   Patient/Family In Agreement With Plan yes

## 2017-11-10 NOTE — ED NOTES
Assisted with rectal exam, after removing diaper pt found to have wound to the saccrum and excoriated skin to buttocks and vagina, barrier cream applied to areas and wound consult placed.

## 2017-11-10 NOTE — CONSULTS
Consult to Wound Care for Sacral and Vaginal skin break down. Assisted by Renetta and Sravanthi, both floor nurses. Pictures taken. Patient was to leave for [facility] as soon as wound care completed.    Sacrum and vaginal areaS are moist on a continual basis, as patient is incontinent of urine and stool. Patient has a rectal tube and a urinary catheter, but still remains very moist. Additionally, patient is very contracted. Both areas have Moisture Associated Skin Damage. There are 3 areas with partial thickness skin loss. Cleaned with No-Rinse, Self-Sudsing Cloths. Dried gently. Applied a thick coat of Triad Paste over these areas, with a much thinner coat over the rest of the perineal area, as a moisture barrier.    Recommend: Sacrum and Vaginal areas - After perineal care, apply a thick coat of Triad Paste to the three areas of partial thickness break down. Cover the rest of the perineum with Zinc based paste. Repeat when paste is soiled.    Lorna Vidales RN, CWOCN

## 2017-11-10 NOTE — NURSING
Called pharmacy regarding infusing bicarb gtt through a peripheral access. Per Jonah small to run through PIV

## 2017-11-10 NOTE — PHYSICIAN QUERY
"PT Name: Yamini Ellis  MR #: [id number]    Physician Query Form - Hematology Clarification      CDS/: Mary Bustos RN              Contact information: [phone]    This form is a permanent document in the medical record.      Query Date: November 10, 2017    By submitting this query, we are merely seeking further clarification of documentation. Please utilize your independent clinical judgment when addressing the question(s) below.    The Medical record contains the following:   Indicators  Supporting Clinical Findings Location in Medical Record   x "Anemia" documented Severe anemia    Anemia, blood loss   11/10 Hosp med consult    11/9 ED MD note   x H & H = H/H= 6.9 / 19.7  H/H= 9.7/ 27.9 11/9 Lab  10/10 Lab   x BP =                     HR= BP= 140/72  HR= 68 11/10 Flowsheet     x "GI bleeding" documented GI bleed  Guaic positive stool   11/10HP    Acute bleeding (Non GI site)     x Transfusion(s) Transfused 2 units PRBC   11/9 Lab   x Treatment: GI consult  Daily CBC  Cardiac monitoring  Pulse oximetry  Transfuse 2 units PRBC   11/10 NSG orders   x Other:  NH resident with urosepsis ileus and aspiration pneumonia with severe anemia without signs of GI blood loss. She has a large bladder mass that is associated with bilateral hydronephrosis and hematuria and she is on Plavix. She has no acute visceral signs and in the context of no active GI blood loss, will not advise endoscopic evaluation.   11/10 Hosp med consult     Provider, please specify diagnosis or diagnoses associated with above clinical findings.    [  ] Acute blood loss anemia    [  ] Iron deficiency anemia    [ X ] Chronic blood loss anemia    [  ] Anemia of chronic disease ( Specify chronic disease)                 [   ] chronic kidney disease     [  ] Other (Specify) _______________________________     [  ] Clinically Undetermined     [  ] Other Hematological Diagnosis (please specify): _________________________________    [  ] Clinically " Undetermined       Please document in your progress notes daily for the duration of treatment, until resolved, and include in your discharge summary.

## 2017-11-10 NOTE — PLAN OF CARE
Writer received call from Lottie informing writer that patient's niece Silvia Tuttle [phone] will come to bedside today to sign consents. Kenzie at [facility] [phone] informed and will meet with patient and family. Pending Hospice orders from MD.    1431-Writer called [facility] and spoke with Lottie to inform them patient is ready for discharge. Per Lottie, patient is ready to be received. Patient is bed-bound and will D/C via [facility] once NH clears patient.     5664-Writer spoke with RUTH Jackson to inform her of # to call report [phone] and speak with RUTH Ruggiero or RUTH PERRIN. Patient will return to room 11B. Pending niece to sign Hospice consents, Kenzie from [facility] at bedside awaiting family.     6136-Writer spoke with Kenzie from [facility], essie is signing consents at bedside.     4033-Writer called ICU to inform RUTH Jackson [facility] to  patient approximately at 1600. Per RUTH Dickey, RUTH Jackson attending patient with wound care needs. RUTH Dickey to inform RUTH Jackson. Writer tubed [facility] packet to ICU, RUTH Dickey informed as well and will give packet to RUTH Jackson for EMT's.

## 2017-11-10 NOTE — PLAN OF CARE
Problem: Patient Care Overview  Goal: Plan of Care Review  Outcome: Ongoing (interventions implemented as appropriate)  Patient received on 2LNC with adequate saturation. BBS clear. PRN treatment not indicated or requested at this time.

## 2017-11-10 NOTE — CONSULTS
Consult Note  Nephrology    Consult Requested By: Cassie Ross, *  Reason for Consult: WANDER    SUBJECTIVE:     History of Present Illness:  Patient is a 87 y.o. female presents with s/s aspiration Pna from [facility].  Pt known to us from previous admissions and was on Hospice.  Further evaluation in ED pt was noted to be in WANDER/>K/Severe acidosis.  Admitted to ICU for monitoring.  Bicarb drip started.      Consulted for evaluation.  We have had discussions with pt and POA in past about wishes and they did not want any heroics.  She definitely needs comfort measures as she is contracted in bed, bed sores, unable to eat, extensive uncurable bladder ca.      Discussed with hospitalist and will help transition to inpt hospice.      Pt seen and examined.  Denies pain.  Extremely frail and malnourished.      Past Medical History:   Diagnosis Date    Anemia of decreased vitamin B12 absorption     Aphasia     Cancer 2013    Bladder cancer, no chemo no radiation.     CKD (chronic kidney disease), stage V     Coronary atherosclerosis of unspecified type of vessel, native or graft     DNR (do not resuscitate)     Epilepsy     Hemiparesis affecting right side as late effect of cerebrovascular accident     Hypertension     Hypokalemia     Hypothyroidism     Other and unspecified hyperlipidemia     Patient on full hospice care     Stroke     Transient ischemic attack (TIA), and cerebral infarction without residual deficits(V12.54)     Unspecified hypothyroidism      History reviewed. No pertinent surgical history.  History reviewed. No pertinent family history.  Social History   Substance Use Topics    Smoking status: Former Smoker    Smokeless tobacco: Never Used    Alcohol use No       Review of patient's allergies indicates:   Allergen Reactions    Lactose         Review of Systems:    No c/o.    OBJECTIVE:     Vital Signs (Most Recent)  Temp: 98.2 °F (36.8 °C) (11/10/17 0800)  Pulse: 92  (11/10/17 0800)  Resp: 20 (11/10/17 0800)  BP: (!) 150/85 (11/10/17 0800)  SpO2: 96 % (11/10/17 0800)    Vital Signs Range (Last 24H):  Temp:  [92.5 °F (33.6 °C)-98.6 °F (37 °C)]   Pulse:  []   Resp:  [17-37]   BP: ()/(53-88)   SpO2:  [92 %-100 %]       Intake/Output Summary (Last 24 hours) at 11/10/17 0903  Last data filed at 11/10/17 0630   Gross per 24 hour   Intake          1835.68 ml   Output              745 ml   Net          1090.68 ml       Physical Exam:  General appearance: elderly/frail/malnourished/contracted  Eyes:  Sluggish.  Lungs: Normal respiratory effort,   Few rales left.    Heart: Regular rate and rhythm, S1, S2 normal, + murmur, rub or jitendra.  Abdomen: Soft, non-tender non-distended; bowel sounds normal; no masses,  no organomegaly  Extremities: contracted.    Skin: dry with multiple bed sores  Neurologic: essentially nonverbal.  Contracted  Vail with gross hematuria      Laboratory:    Recent Labs  Lab 11/10/17  0804   WBC 9.01   RBC 3.12*   HGB 9.7*   HCT 27.9*   PLT 87*   MCV 89   MCH 31.1*   MCHC 34.8     BMP:     Recent Labs  Lab 11/10/17  0327   *   *   K 5.3*   *   CO2 5*   *   CREATININE 10.7*   CALCIUM 8.0*   MG 1.5*     Lab Results   Component Value Date    CALCIUM 8.0 (L) 11/10/2017    PHOS 4.9 (H) 11/10/2017         Diagnostic Results:    Impression:   Chronic lung changes with increased interstitial and airspace opacification within the medial aspect of the left lower lung zone which could reflect atelectasis versus aspiration or developing pneumonia in the right clinical setting.      ASSESSMENT/PLAN:     1. Nonoliguric CKD V with progression to ESRD secondary to advanced bladder cancer/severe metabolic acidosis/hyperkalemia (N18.6, E87.2, E87.5):  Not an HD candidate per earlier wishes.  Continue with bicarb drip/vail cath for now.  Discussed with Dr. oRss and will transition back to NH with Hospice.  Comfort  measures.  2. Extensive Bladder Ca (C67.9):  See note from Dr. Yo 6/17/17.  Not amenable to any treatment.  Discussed with POA and Hospice.  3. Aspiration Pna (J69.0):  Covered with vanc/fortaz.  4. Anemia secondary to above:  Transfused 2 units.  DC PPI drip.  5. DNR/Hospice      Thanks for consult  Discussed at length with Hospitalist.  DC back to NH with Hospice today.

## 2017-11-10 NOTE — SUBJECTIVE & OBJECTIVE
Past Medical History:   Diagnosis Date    Anemia of decreased vitamin B12 absorption     Aphasia     Coronary atherosclerosis of unspecified type of vessel, native or graft     Epilepsy     Hemiparesis affecting right side as late effect of cerebrovascular accident     Hypertension     Hypokalemia     Hypothyroidism     Other and unspecified hyperlipidemia     Stroke     Transient ischemic attack (TIA), and cerebral infarction without residual deficits(V12.54)     Unspecified hypothyroidism        History reviewed. No pertinent surgical history.    Review of patient's allergies indicates:   Allergen Reactions    Lactose        No current facility-administered medications on file prior to encounter.      Current Outpatient Prescriptions on File Prior to Encounter   Medication Sig    cyanocobalamin (VITAMIN B-12) 1000 MCG tablet Take 100 mcg by mouth once daily.    levetiracetam (KEPPRA) 250 MG Tab Take 250 mg by mouth 2 (two) times daily.    mirabegron (MYRBETRIQ) 25 mg Tb24 ER tablet Take 25 mg by mouth once daily.     Family History     None        Social History Main Topics    Smoking status: Former Smoker    Smokeless tobacco: Never Used    Alcohol use No    Drug use: No    Sexual activity: Not on file     Review of Systems   Unable to perform ROS: Dementia     Objective:     Vital Signs (Most Recent):  Temp: (!) 92.8 °F (33.8 °C) (11/10/17 0130)  Pulse: 60 (11/10/17 0130)  Resp: (!) 29 (11/10/17 0130)  BP: 131/65 (11/10/17 0130)  SpO2: 96 % (11/10/17 0130) Vital Signs (24h Range):  Temp:  [92.5 °F (33.6 °C)-92.8 °F (33.8 °C)] 92.8 °F (33.8 °C)  Pulse:  [] 60  Resp:  [18-29] 29  SpO2:  [92 %-100 %] 96 %  BP: (118-164)/(55-88) 131/65     Weight: 58 kg (127 lb 13.9 oz)  Body mass index is 20.64 kg/m².    Physical Exam   Constitutional: She appears well-developed and well-nourished. She appears lethargic.   HENT:   Head: Normocephalic.   Eyes: Conjunctivae are normal. Right eye exhibits no  discharge. Left eye exhibits no discharge.   Neck: Normal range of motion. Neck supple.   Cardiovascular: Normal rate, regular rhythm and normal heart sounds.    Pulses:       Radial pulses are 1+ on the right side, and 1+ on the left side.        Dorsalis pedis pulses are 1+ on the right side, and 1+ on the left side.   Pulmonary/Chest: Effort normal. Tachypnea noted. No respiratory distress. She has wheezes in the right upper field and the left upper field.   Abdominal: Soft. She exhibits no distension. Bowel sounds are decreased. There is no tenderness.   Musculoskeletal: Normal range of motion.   Neurological: She appears lethargic. GCS eye subscore is 3. GCS verbal subscore is 4. GCS motor subscore is 5.   Skin: Skin is warm and dry. Capillary refill takes 2 to 3 seconds. There is pallor.   Psychiatric: Her affect is labile. She is slowed.        Significant Labs:   CBC:   Recent Labs  Lab 11/09/17  2205   WBC 11.30   HGB 6.9*   HCT 19.7*   PLT 96*     CMP:   Recent Labs  Lab 11/09/17  2205      K 5.7*   *   CO2 9*   GLU 99   *   CREATININE 11.0*   CALCIUM 8.6*   ANIONGAP 17*   EGFRNONAA 3*       Significant Imaging: I have reviewed all pertinent imaging results/findings within the past 24 hours.

## 2017-11-10 NOTE — ED NOTES
Rounding on pt complete. Pt is resting comfortably with no signs of distress noted. Pt is updated on plan of care and is in agreement. Pt has no complaints at this time.

## 2017-11-10 NOTE — PLAN OF CARE
Problem: Patient Care Overview  Goal: Plan of Care Review  Outcome: Ongoing (interventions implemented as appropriate)  Pt VSS and receiving second unit of blood this shift, has rectal thermometer and bear hugger in place. Pt has skin breakdown to the vaginal and buttock area, wound care consulted. Needs nephrology and GI consults. Powers in place and on 2LO2NC. Pt is DNR.

## 2017-11-10 NOTE — PLAN OF CARE
"1200-Writer spoke with patient's fiduciary Ms. Tafoya who informed writer she spoke with [facility] and was told patient is "not on hospice but on comfort care".     1203-Writer spoke with RUTH Ruggiero at [facility] [phone]  regarding clarification on Hospice status. Per RUTH Ruggiero, patient "was never on Hospice". RUTH Ruggiero transferred writer to speak with NIKOLAS Mari who confirmed patient was never on Hospice care because patient would not sign consents, however, no POA paperwork is on file with [facility], [facility], or with family. Unclear of who is assigned to make medical decisions for patient as Ms. Tafoya is the only active person attentive to patient's care per RN and NIKOLAS at [facility]. NIKOLAS Mari to call writer back with details regarding who can sign consents for new Hospice orders.   "

## 2017-11-10 NOTE — H&P
[facility] Hospital Medicine  History & Physical    Patient Name: Yamini Ellis  MRN: [id number]  Admission Date: 11/9/2017  Attending Physician: Cassie Ross, *   Primary Care Provider: Merrill Shaver MD         Patient information was obtained from patient, past medical records and ER records.     Subjective:     Principal Problem:Acute renal failure    Chief Complaint:   Chief Complaint   Patient presents with    Aspiration     Patient was eating dinner and began to aspirate per staff at [facility].          HPI: This is a 87 y.o. female who presents with complaint of possible aspiration that began a couple hours ago. Per nursing home staff, patient began to aspirate while eating dinner. When asked by nursing staff about cough, patient said she was eating too fast. Nursing home sent her to ED for further evaluation.       Past Medical History:   Diagnosis Date    Anemia of decreased vitamin B12 absorption     Aphasia     Coronary atherosclerosis of unspecified type of vessel, native or graft     Epilepsy     Hemiparesis affecting right side as late effect of cerebrovascular accident     Hypertension     Hypokalemia     Hypothyroidism     Other and unspecified hyperlipidemia     Stroke     Transient ischemic attack (TIA), and cerebral infarction without residual deficits(V12.54)     Unspecified hypothyroidism        History reviewed. No pertinent surgical history.    Review of patient's allergies indicates:   Allergen Reactions    Lactose        No current facility-administered medications on file prior to encounter.      Current Outpatient Prescriptions on File Prior to Encounter   Medication Sig    cyanocobalamin (VITAMIN B-12) 1000 MCG tablet Take 100 mcg by mouth once daily.    levetiracetam (KEPPRA) 250 MG Tab Take 250 mg by mouth 2 (two) times daily.    mirabegron (MYRBETRIQ) 25 mg Tb24 ER tablet Take 25 mg by mouth once daily.     Family History     None         Social History Main Topics    Smoking status: Former Smoker    Smokeless tobacco: Never Used    Alcohol use No    Drug use: No    Sexual activity: Not on file     Review of Systems   Unable to perform ROS: Dementia     Objective:     Vital Signs (Most Recent):  Temp: (!) 92.8 °F (33.8 °C) (11/10/17 0130)  Pulse: 60 (11/10/17 0130)  Resp: (!) 29 (11/10/17 0130)  BP: 131/65 (11/10/17 0130)  SpO2: 96 % (11/10/17 0130) Vital Signs (24h Range):  Temp:  [92.5 °F (33.6 °C)-92.8 °F (33.8 °C)] 92.8 °F (33.8 °C)  Pulse:  [] 60  Resp:  [18-29] 29  SpO2:  [92 %-100 %] 96 %  BP: (118-164)/(55-88) 131/65     Weight: 58 kg (127 lb 13.9 oz)  Body mass index is 20.64 kg/m².    Physical Exam   Constitutional: She appears well-developed and well-nourished. She appears lethargic.   HENT:   Head: Normocephalic.   Eyes: Conjunctivae are normal. Right eye exhibits no discharge. Left eye exhibits no discharge.   Neck: Normal range of motion. Neck supple.   Cardiovascular: Normal rate, regular rhythm and normal heart sounds.    Pulses:       Radial pulses are 1+ on the right side, and 1+ on the left side.        Dorsalis pedis pulses are 1+ on the right side, and 1+ on the left side.   Pulmonary/Chest: Effort normal. Tachypnea noted. No respiratory distress. She has wheezes in the right upper field and the left upper field.   Abdominal: Soft. She exhibits no distension. Bowel sounds are decreased. There is no tenderness.   Musculoskeletal: Normal range of motion.   Neurological: She appears lethargic. GCS eye subscore is 3. GCS verbal subscore is 4. GCS motor subscore is 5.   Skin: Skin is warm and dry. Capillary refill takes 2 to 3 seconds. There is pallor.   Psychiatric: Her affect is labile. She is slowed.        Significant Labs:   CBC:   Recent Labs  Lab 11/09/17  2205   WBC 11.30   HGB 6.9*   HCT 19.7*   PLT 96*     CMP:   Recent Labs  Lab 11/09/17  2205      K 5.7*   *   CO2 9*   GLU 99   *    CREATININE 11.0*   CALCIUM 8.6*   ANIONGAP 17*   EGFRNONAA 3*       Significant Imaging: I have reviewed all pertinent imaging results/findings within the past 24 hours.    Assessment/Plan:     * Acute renal failure    Creatinine 11.   Nephrology consulted  -Dr. Aviles contacted by ER- D5W with 3 amps Bicarb recommended         GI bleed    H/H- 6.9/19.7  Guaic positive stool  Transfusing 2 units PRBC  GI consulted          Aspiration into airway    Presumed aspiration  -Vanc and Fortaz ordered per Nephrology recommendations          Essential hypertension    Normotensive currently  Will monitor            VTE Risk Mitigation         Ordered     heparin (porcine) injection 5,000 Units  Every 8 hours     Route:  Subcutaneous        11/10/17 0016     Medium Risk of VTE  Once      11/10/17 0016     Place SP hose  Until discontinued      11/10/17 0016     Place sequential compression device  Until discontinued      11/10/17 0016             Michele Lozano NP  Department of Hospital Medicine   [facility]

## 2017-11-10 NOTE — ED PROVIDER NOTES
Encounter Date: 11/9/2017    SCRIBE #1 NOTE: I, Freya oHng, am scribing for, and in the presence of, Dr. Gee.       History     Chief Complaint   Patient presents with    Aspiration     Patient was eating dinner and began to aspirate per staff at [facility].       Time seen by provider: 8:05 PM    This is a 87 y.o. female who presents with complaint of possible aspiration that began a couple hours ago. Per nursing home staff, patient began to aspirate while eating dinner. When asked by nursing staff about cough, patient said she was eating too fast. Nursing home sent her to ED for further evaluation.        The history is provided by the [facility]. The history is limited by the condition of the patient.     Review of patient's allergies indicates:   Allergen Reactions    Lactose      Past Medical History:   Diagnosis Date    Anemia of decreased vitamin B12 absorption     Aphasia     Coronary atherosclerosis of unspecified type of vessel, native or graft     Epilepsy     Hemiparesis affecting right side as late effect of cerebrovascular accident     Hypertension     Hypokalemia     Hypothyroidism     Other and unspecified hyperlipidemia     Stroke     Transient ischemic attack (TIA), and cerebral infarction without residual deficits(V12.54)     Unspecified hypothyroidism      History reviewed. No pertinent surgical history.  History reviewed. No pertinent family history.  Social History   Substance Use Topics    Smoking status: Former Smoker    Smokeless tobacco: Never Used    Alcohol use No     Review of Systems   Constitutional: Negative for chills and fever.   HENT: Negative for congestion and sore throat.    Eyes: Negative for visual disturbance.   Respiratory: Negative for cough and shortness of breath.    Cardiovascular: Negative for chest pain and palpitations.   Gastrointestinal: Negative for abdominal pain, diarrhea, nausea and vomiting.   Genitourinary: Negative for  decreased urine volume, dysuria and vaginal discharge.   Musculoskeletal: Negative for back pain, joint swelling, neck pain and neck stiffness.   Skin: Negative for rash and wound.   Neurological: Negative for weakness, numbness and headaches.   Hematological: Does not bruise/bleed easily.   Psychiatric/Behavioral: Negative for confusion.       Physical Exam     Initial Vitals [11/09/17 1927]   BP Pulse Resp Temp SpO2   (!) 144/88 100 20 -- 100 %      MAP       106.67         Physical Exam    Nursing note and vitals reviewed.  Constitutional: She appears well-developed and well-nourished. She is cooperative.  Non-toxic appearance. No distress.   HENT:   Head: Normocephalic and atraumatic.   Mouth/Throat: Oropharynx is clear and moist.   Eyes: Conjunctivae and EOM are normal. Pupils are equal, round, and reactive to light.   Conjunctiva are pink, clear, and intact.   Neck: Normal range of motion and full passive range of motion without pain. Neck supple. No thyromegaly present. No JVD present.   Cardiovascular: Normal rate, regular rhythm, normal heart sounds and normal pulses.   No murmur heard.  Normal S1, S2. No murmurs, no rubs, no gallops.    Pulmonary/Chest: Effort normal and breath sounds normal. No respiratory distress.   Clear to ascultation bilaterally.  No respiratory distress.     Abdominal: Soft. Normal appearance and bowel sounds are normal. She exhibits no distension and no mass. There is no tenderness.   Musculoskeletal: Normal range of motion.   Neurological: She is alert. She has normal strength. No cranial nerve deficit or sensory deficit.   Skin: Skin is warm, dry and intact. No rash noted.   Psychiatric: She has a normal mood and affect. Her speech is normal and behavior is normal. Judgment and thought content normal.         ED Course   Procedures  Labs Reviewed   CBC W/ AUTO DIFFERENTIAL - Abnormal; Notable for the following:        Result Value    RBC 2.21 (*)     Hemoglobin 6.9 (*)      Hematocrit 19.7 (*)     MCH 31.2 (*)     RDW 15.2 (*)     Platelets 96 (*)     MPV 8.9 (*)     All other components within normal limits    Narrative:     HCT critical result(s) called and verbal readback obtained from   Skye Hernández RN. , 11/09/2017 22:32   CULTURE, BLOOD   CULTURE, BLOOD   BASIC METABOLIC PANEL   B-TYPE NATRIURETIC PEPTIDE   TYPE & SCREEN   PREPARE RBC SOFT        Imaging Results          X-Ray Chest AP Portable (Final result)  Result time 11/09/17 21:09:25    Final result by Louie Mejia MD (11/09/17 21:09:25)                 Impression:      Chronic lung changes with increased interstitial and airspace opacification within the medial aspect of the left lower lung zone which could reflect atelectasis versus aspiration or developing pneumonia in the right clinical setting.      Electronically signed by: LOUEI MEJIA MD  Date:     11/09/17  Time:    21:09              Narrative:    Chest AP single view.  Comparison: 6/15/17.    Cardiac silhouette is normal in stable in size.  Mediastinal structures are midline.  Lungs are symmetrically expanded.  Mild chronic lung changes are seen with bilateral interstitial prominence.  There is increased interstitial and airspace opacification visualized within the medial aspect of the left lower lung zone.  No evidence of pneumothorax or significant effusion.  Bones appear intact.  No free air visualized beneath the diaphragm.                              X-Rays:   Independently Interpreted Readings:   Chest X-Ray: Possible left middle lobe developing consolidation.                   Attending Attestation:             Attending ED Notes:   Emergent evaluation of 87-year-old female with suspected aspiration while eating dinner tonight.  Patient is afebrile, nontoxic-appearing with stable vital signs except for increased respiratory rate.  Oxygen saturation is 98% on room air.  Patient in no acute respiratory distress.  Chest x-ray reveals possible  developing pneumonia versus atelectasis.  Concern for early developing aspiration pneumonia.  Will admit and continue to monitor.  Blood cultures are drawn.  Repeat x-ray in the morning.  Patient is extensive the counseled on her diagnosis and treatment   and admitted in stable condition.    H&H is 6.9 and 19.7.  No elevation of white blood cell count.  Patient appears to have a slow transient decline in her hemoglobin and hematocrit. Patient has guaiac positive.  Moonlighter notified.    On BMP patient's potassium was 5.7, chloride 113, bicarbonate 9, BUN of 1:15, creatinine of 11, calcium 8.6, anion gap was 17.  BNP is 108.    At 11:00 PM I consulted and discussed patient with nephrology on-call, Dr. Aviles.  Dr. Aviles recommended administration of Vancomycin 1 g IV, Fortaz 1 g IV, 1 L of D5 W with 3 amps of bicarbonate and to run at 150 cc per hour.  Also recommendation was to perform urine culture, urine spot sodium, urine spot creatinine, urine eosinophils and urine spot protein.  Orders were placed.  Moonlighter is notified.        9:55PM consulted and discussed patient with moonlighter on call who will admit the patient.          ED Course      1. Aspiration into airway, initial encounter    2. Cough    3. Anemia, blood loss    4. Acute upper GI bleed                                 Tim Isabel MD  11/09/17 2158       Tim Isabel MD  11/09/17 2235       Tim Isabel MD  11/09/17 2242       Tim Isabel MD  11/09/17 2247       Tim Isabel MD  11/09/17 7652

## 2017-11-10 NOTE — CONSULTS
[facility] Hospital Medicine  Consult Note    Patient Name: Yamini Ellis  MRN: [id number]  Admission Date: 11/9/2017  Hospital Length of Stay: 1 days  Attending Physician:   Primary Care Provider: Merrill Shaver MD           Patient information was obtained from      Inpatient consult to Gastroenterology  Consult performed by: ROSY GARCIA  Consult ordered by: ANTON PRATHER  Reason for consult: anemia        Subjective: aspiration pneumonia, severe anemia     Principal Problem:Acute renal failure    Chief Complaint:   Chief Complaint   Patient presents with    Aspiration     Patient was eating dinner and began to aspirate per staff at [facility].          HPI: This lady was transferred to ED out of concern for acute aspiration. She was found to be short of breath and severely anemic. No abdominal pain nausea or emesis or acute visceral signs or passing melena or red blood per rectum. She had a vail that drained bloody urine and she has a history of a bladder tumor that is associated with hydronephrosis bilaterally. She has had a CVA in the past and is on clopidogrel. DNR status will be addressed. She is malnourished and has stage 2 decubitus over sacral area.    Past Medical History:   Diagnosis Date    Anemia of decreased vitamin B12 absorption     Aphasia     Cancer 2013    Bladder cancer, no chemo no radiation.     Coronary atherosclerosis of unspecified type of vessel, native or graft     Epilepsy     Hemiparesis affecting right side as late effect of cerebrovascular accident     Hypertension     Hypokalemia     Hypothyroidism     Other and unspecified hyperlipidemia     Stroke     Transient ischemic attack (TIA), and cerebral infarction without residual deficits(V12.54)     Unspecified hypothyroidism        History reviewed. No pertinent surgical history.    Review of patient's allergies indicates:   Allergen Reactions    Lactose        No current  facility-administered medications on file prior to encounter.      Current Outpatient Prescriptions on File Prior to Encounter   Medication Sig    cyanocobalamin (VITAMIN B-12) 1000 MCG tablet Take 100 mcg by mouth once daily.    levetiracetam (KEPPRA) 250 MG Tab Take 250 mg by mouth 2 (two) times daily.    mirabegron (MYRBETRIQ) 25 mg Tb24 ER tablet Take 25 mg by mouth once daily.     Family History     None        Social History Main Topics    Smoking status: Former Smoker    Smokeless tobacco: Never Used    Alcohol use No    Drug use: No    Sexual activity: Not on file     Review of Systems   Unable to perform ROS: Mental status change     Objective:     Vital Signs (Most Recent):  Temp: 98.2 °F (36.8 °C) (11/10/17 0750)  Pulse: 88 (11/10/17 0750)  Resp: (!) 21 (11/10/17 0750)  BP: (!) 144/66 (11/10/17 0700)  SpO2: 96 % (11/10/17 0750) Vital Signs (24h Range):  Temp:  [92.5 °F (33.6 °C)-98.6 °F (37 °C)] 98.2 °F (36.8 °C)  Pulse:  [] 88  Resp:  [17-37] 21  SpO2:  [92 %-100 %] 96 %  BP: ()/(53-88) 144/66     Weight: 60.6 kg (133 lb 9.6 oz)  Body mass index is 21.56 kg/m².    Physical Exam   Constitutional: She appears well-developed. No distress.   HENT:   Head: Normocephalic and atraumatic.   Eyes: EOM are normal. Pupils are equal, round, and reactive to light. No scleral icterus.   Neck: No JVD present. No thyromegaly present.   Cardiovascular: Normal rate and regular rhythm.    Pulmonary/Chest: No stridor. She has wheezes. She has rales.   Abdominal: Soft. Bowel sounds are normal. She exhibits no distension and no mass. There is no tenderness. There is no rebound and no guarding. No hernia.   Musculoskeletal:   Severe contractures   Lymphadenopathy:     She has no cervical adenopathy.   Neurological: She is alert.   Skin: Skin is warm and dry. She is not diaphoretic.   Psychiatric:   Unable to communicate effectively   Nursing note and vitals reviewed.      Significant Labs: as reported,  severe anemia, elevated creatinine, severe metabolic acidosis    Significant Imaging: reviewed    Assessment/Plan: Elderly lady NH resident with urosepsis ileus and aspiration pneumonia with severe anemia without signs of GI blood loss. She has a large bladder mass that is associated with bilateral hydronephrosis and hematuria and she is on Plavix. She has no acute visceral signs and in the context of no active GI blood loss, will not advise endoscopic evaluation. PA needs to determine DNR status. Will advise pantoprazole 40 mg IV daily and monitor with you.     Active Diagnoses:    Diagnosis Date Noted POA    PRINCIPAL PROBLEM:  Acute renal failure [N17.9] 06/15/2017 Yes    GI bleed [K92.2] 11/10/2017 Unknown    Aspiration into airway [T17.908A] 11/09/2017 Yes    Essential hypertension [I10] 06/15/2017 Yes      Problems Resolved During this Admission:    Diagnosis Date Noted Date Resolved POA     VTE Risk Mitigation         Ordered     Medium Risk of VTE  Once      11/10/17 0016     Place SP hose  Until discontinued      11/10/17 0016     Place sequential compression device  Until discontinued      11/10/17 0016          HOS POC IP DISCHARGE SUMMARY    Thank you for your consult.     Kale Byrnes MD  Department of Hospital Medicine   [facility]

## 2017-11-10 NOTE — HPI
This is a 87 y.o. female who presents with complaint of possible aspiration that began a couple hours ago. Per nursing home staff, patient began to aspirate while eating dinner. When asked by nursing staff about cough, patient said she was eating too fast. Nursing home sent her to ED for further evaluation.

## 2017-11-10 NOTE — ED NOTES
"Pt presents to ED via EMS from [facility]. Report was received from [place] at [facility] ([phone]). Nurse stated pt began to sound "wet and wheezing" after eating dinner and was having "a hard time talking." Pt was sent here for rule out aspiration. Pt is AAO x2, pt states she was "eating too fast tonight." Pt sounds congested with a non productive cough. Pt is using abdominal accessory muscles to assist breathing and has elevated breathing. Pt does not appear to be in any signs of distress. Pt is able to talk in full sentences. Pt placed on cardiac monitor, pulse ox, and BP. Siderails are up x2 and  Call bell in reach. Pt has deficits from previous stroke. Will continue to monitor.   "

## 2017-11-10 NOTE — PLAN OF CARE
"1538-Writer received call from RUTH Jackson who informed writer RUTH SHERIFF At [facility] informed her "I can't take the patient back, we need more paperwork". Writer informed RUTH Jackson that per Ms. Tafoya, staff at [facility] she received the orders and confirmed patient can return. Writer called Ms. Tafoya at [facility] [phone] to address clarification, per Ms. Tafoya, patient "Absolutely can return home, RUTH SHERIFF Could have gotten the paperwork from me, I will give it to the nurse". [facility] in ICU and writer informed RUTH Jackson that per Ms. Tafoya at [facility] patient can return and she can call report to RUTH SHERIFF  "

## 2017-11-11 LAB — BACTERIA UR CULT: NO GROWTH

## 2017-11-13 LAB
BACTERIA BLD CULT: NORMAL

## 2017-11-14 LAB
BACTERIA BLD CULT: NORMAL

## 2017-11-30 NOTE — DISCHARGE SUMMARY
[facility] Hospital Medicine  Discharge Summary      Patient Name: Yamini Ellis  MRN: [id number]  Admission Date: 11/9/2017  Hospital Length of Stay: 1 days  Discharge Date and Time: 11/10/2017  3:50 PM  Attending Physician: No att. providers found   Discharging Provider: Cassie Ross MD  Primary Care Provider: Merrill Shaver MD      HPI:   This is a 87 y.o. female who presents with complaint of possible aspiration that began a couple hours ago. Per nursing home staff, patient began to aspirate while eating dinner. When asked by nursing staff about cough, patient said she was eating too fast. Nursing home sent her to ED for further evaluation.         Hospital Course:   Patient evaluated by Nephrology during hospital stay.  Well known to that team.  Discussed case with family and agreed that patient should stay on Hospice and no heroic measures be taken.  Patient with ESRD and guaic positive stools, but no further intervention was taken and patient discharged back to Nursing Facility.      Consults:   Consults         Status Ordering Provider     Inpatient consult to Gastroenterology  Once     Provider:  Kale Byrnes MD    Completed ANTON PRATHER     Inpatient consult to Nephrology  Once     Provider:  Mary Aviles MD    Completed ANTON PRATHER     Inpatient consult to Social Work/Case Management  Once     Provider:  (Not yet assigned)    ASAF Rodriguez          No new Assessment & Plan notes have been filed under this hospital service since the last note was generated.  Service: Hospital Medicine    Final Active Diagnoses:    Diagnosis Date Noted POA    PRINCIPAL PROBLEM:  Acute renal failure [N17.9] 06/15/2017 Yes    GI bleed [K92.2] 11/10/2017 Yes    Wound of buttock [S31.809A] 11/10/2017 Yes    Aspiration into airway [T17.908A] 11/09/2017 Yes    Essential hypertension [I10] 06/15/2017 Yes      Problems Resolved During this Admission:    Diagnosis Date  Noted Date Resolved POA       Discharged Condition: stable    Disposition: Hospice/Medical Facility    Follow Up:  Follow-up Information     Merrill Shaver MD In 1 week.    Specialty:  General Practice  Contact information:  [address]  [address]  [facility] [address] [phone]                 Patient Instructions:     Diet Adult Regular     Vital signs per facility protocol     Skin assessment every shift      Bed rest     DNR (Do Not Resuscitate)         Significant Diagnostic Studies: Labs: All labs within the past 24 hours have been reviewed    Pending Diagnostic Studies:     None         Medications:  Reconciled Home Medications:   Discharge Medication List as of 11/10/2017  4:12 PM      CONTINUE these medications which have NOT CHANGED    Details   cyanocobalamin (VITAMIN B-12) 1000 MCG tablet Take 100 mcg by mouth once daily., Historical Med      levetiracetam (KEPPRA) 250 MG Tab Take 250 mg by mouth 2 (two) times daily., Historical Med      mirabegron (MYRBETRIQ) 25 mg Tb24 ER tablet Take 25 mg by mouth once daily., Historical Med             Indwelling Lines/Drains at time of discharge:   Lines/Drains/Airways     Drain                 Urethral Catheter 11/09/17 2348 Straight-tip 16 Fr. 20 days                Time spent on the discharge of patient: >30 minutes  Patient was seen and examined on the date of discharge and determined to be suitable for discharge.         Cassie Ross MD  Department of Hospital Medicine  [facility]

## 2018-06-14 NOTE — CHAPLAIN
Pt asleep. Visited with friend. Facilitated story telling as pt friend debriefed, trying to process sudden decline and news received from MD today. Pt family will be coming to hospital later today, as per friend. Provided compassionate presence and prayer. Informed pt friend of [place] availability and she voiced understanding and appreciation.  Janie MCCARTY  [id number]   [facility]AC[facility] cti

## 2022-10-03 NOTE — HPI
"Ms. Yamini Ellis is a 87 y.o. female who presented to the ED from [facility] where she lives, at her PCP's request (Dr. Shaver) for treatment of abnormal labs. The patient is a poor historian, and most information was gathered from her POA [facility] ([phone] or [phone]). The patient herself has no complaints other than 10 lb weight loss over 2 months 2/2 "[place]" where she lives. Upon evaluation in the ED she was found to have K of 6.5, BUN of 118 & Cr of 14.0. She denies fatigue, chest pain, SOB, abdominal pain, nausea, vomiting, diarrhea, constipation, muscle weakness, pruritus, leg swelling. Her POA, at bedside, endorses the patient is "not herself, less talkative than usual." [facility] records requested for [facility].  " Was done for at least one hour

## (undated) DEVICE — SYR 30CC LUER LOCK

## (undated) DEVICE — Device

## (undated) DEVICE — SOL 9P NACL IRR PIC IL

## (undated) DEVICE — SET CYSTO IRRIGATION UNIV SPIK

## (undated) DEVICE — EVACUATOR BLADDER UROVAC ADPT

## (undated) DEVICE — GUIDE WIRE MOTION .035 X 150CM

## (undated) DEVICE — SOL IRR NACL .9% 3000ML

## (undated) DEVICE — BRUSH SCRUB SURGICALW/BETADINE

## (undated) DEVICE — SYRINGE 0.9% NACL 10MIL PREFIL

## (undated) DEVICE — GLOVE BIOGEL SKINSENSE PI 7.0

## (undated) DEVICE — KIT ASPIRATION TUBING FOR SP-2

## (undated) DEVICE — BAG URINARY DRN 2000ML